# Patient Record
Sex: FEMALE | Race: WHITE | NOT HISPANIC OR LATINO | Employment: FULL TIME | URBAN - METROPOLITAN AREA
[De-identification: names, ages, dates, MRNs, and addresses within clinical notes are randomized per-mention and may not be internally consistent; named-entity substitution may affect disease eponyms.]

---

## 2017-01-11 ENCOUNTER — TRANSCRIBE ORDERS (OUTPATIENT)
Dept: ADMINISTRATIVE | Facility: HOSPITAL | Age: 52
End: 2017-01-11

## 2017-01-11 DIAGNOSIS — Z13.9 SCREENING: Primary | ICD-10-CM

## 2017-01-18 ENCOUNTER — APPOINTMENT (OUTPATIENT)
Dept: MAMMOGRAPHY | Facility: HOSPITAL | Age: 52
End: 2017-01-18
Attending: INTERNAL MEDICINE

## 2017-01-18 ENCOUNTER — HOSPITAL ENCOUNTER (OUTPATIENT)
Dept: MAMMOGRAPHY | Facility: HOSPITAL | Age: 52
Discharge: HOME | End: 2017-01-18
Attending: INTERNAL MEDICINE | Admitting: INTERNAL MEDICINE

## 2017-01-18 DIAGNOSIS — Z13.9 SCREENING: ICD-10-CM

## 2017-01-18 PROCEDURE — G0202 SCR MAMMO BI INCL CAD: HCPCS

## 2017-01-18 PROCEDURE — 77063 BREAST TOMOSYNTHESIS BI: CPT

## 2017-01-20 ENCOUNTER — HOSPITAL ENCOUNTER (OUTPATIENT)
Dept: CT IMAGING | Facility: HOSPITAL | Age: 52
Discharge: HOME | End: 2017-01-20
Attending: INTERNAL MEDICINE | Admitting: INTERNAL MEDICINE

## 2017-01-20 ENCOUNTER — LAB (OUTPATIENT)
Dept: LAB | Facility: HOSPITAL | Age: 52
End: 2017-01-20
Attending: INTERNAL MEDICINE

## 2017-01-20 ENCOUNTER — TRANSCRIBE ORDERS (OUTPATIENT)
Dept: ADMINISTRATIVE | Facility: HOSPITAL | Age: 52
End: 2017-01-20

## 2017-01-20 DIAGNOSIS — Z87.442 HISTORY OF KIDNEY STONES: ICD-10-CM

## 2017-01-20 DIAGNOSIS — M54.5 LOW BACK PAIN, UNSPECIFIED BACK PAIN LATERALITY, UNSPECIFIED CHRONICITY, WITH SCIATICA PRESENCE UNSPECIFIED: Primary | ICD-10-CM

## 2017-01-20 DIAGNOSIS — N20.0 KIDNEY STONE: Primary | ICD-10-CM

## 2017-01-20 DIAGNOSIS — N20.0 KIDNEY STONE: ICD-10-CM

## 2017-01-20 DIAGNOSIS — M54.5 LOW BACK PAIN, UNSPECIFIED BACK PAIN LATERALITY, UNSPECIFIED CHRONICITY, WITH SCIATICA PRESENCE UNSPECIFIED: ICD-10-CM

## 2017-01-20 LAB
ANION GAP SERPL CALCULATED.3IONS-SCNC: 11.4 MMOL/L
BASOPHILS # BLD AUTO: 0.06 10*3/MM3 (ref 0–0.2)
BASOPHILS NFR BLD AUTO: 1 % (ref 0–2)
BILIRUB UR QL STRIP: NEGATIVE
BUN BLD-MCNC: 11 MG/DL (ref 6–20)
BUN/CREAT SERPL: 12.4 (ref 7–25)
CALCIUM SPEC-SCNC: 9 MG/DL (ref 8.6–10.5)
CHLORIDE SERPL-SCNC: 97 MMOL/L (ref 98–107)
CLARITY UR: CLEAR
CO2 SERPL-SCNC: 27.6 MMOL/L (ref 22–29)
COLOR UR: YELLOW
CREAT BLD-MCNC: 0.89 MG/DL (ref 0.57–1)
DEPRECATED RDW RBC AUTO: 40.3 FL (ref 37–54)
EOSINOPHIL # BLD AUTO: 0.21 10*3/MM3 (ref 0.1–0.3)
EOSINOPHIL NFR BLD AUTO: 3.6 % (ref 0–4)
ERYTHROCYTE [DISTWIDTH] IN BLOOD BY AUTOMATED COUNT: 12.9 % (ref 11.5–14.5)
GFR SERPL CREATININE-BSD FRML MDRD: 67 ML/MIN/1.73
GLUCOSE BLD-MCNC: 85 MG/DL (ref 65–99)
GLUCOSE UR STRIP-MCNC: NEGATIVE MG/DL
HCT VFR BLD AUTO: 39.6 % (ref 37–47)
HGB BLD-MCNC: 12.9 G/DL (ref 12–16)
HGB UR QL STRIP.AUTO: NEGATIVE
IMM GRANULOCYTES # BLD: 0.01 10*3/MM3 (ref 0–0.03)
IMM GRANULOCYTES NFR BLD: 0.2 % (ref 0–0.5)
KETONES UR QL STRIP: NEGATIVE
LEUKOCYTE ESTERASE UR QL STRIP.AUTO: NEGATIVE
LYMPHOCYTES # BLD AUTO: 2.24 10*3/MM3 (ref 0.6–4.8)
LYMPHOCYTES NFR BLD AUTO: 38 % (ref 20–45)
MCH RBC QN AUTO: 28.2 PG (ref 27–31)
MCHC RBC AUTO-ENTMCNC: 32.6 G/DL (ref 31–37)
MCV RBC AUTO: 86.5 FL (ref 81–99)
MONOCYTES # BLD AUTO: 0.42 10*3/MM3 (ref 0–1)
MONOCYTES NFR BLD AUTO: 7.1 % (ref 3–8)
NEUTROPHILS # BLD AUTO: 2.95 10*3/MM3 (ref 1.5–8.3)
NEUTROPHILS NFR BLD AUTO: 50.1 % (ref 45–70)
NITRITE UR QL STRIP: NEGATIVE
NRBC BLD MANUAL-RTO: 0 /100 WBC (ref 0–0)
PH UR STRIP.AUTO: 6 [PH] (ref 4.5–8)
PLATELET # BLD AUTO: 309 10*3/MM3 (ref 140–500)
PMV BLD AUTO: 8.4 FL (ref 7.4–10.4)
POTASSIUM BLD-SCNC: 3.9 MMOL/L (ref 3.5–5.2)
PROT UR QL STRIP: NEGATIVE
RBC # BLD AUTO: 4.58 10*6/MM3 (ref 4.2–5.4)
SODIUM BLD-SCNC: 136 MMOL/L (ref 136–145)
SP GR UR STRIP: 1.01 (ref 1–1.03)
UROBILINOGEN UR QL STRIP: NORMAL
WBC NRBC COR # BLD: 5.89 10*3/MM3 (ref 4.8–10.8)

## 2017-01-20 PROCEDURE — 80048 BASIC METABOLIC PNL TOTAL CA: CPT

## 2017-01-20 PROCEDURE — 74176 CT ABD & PELVIS W/O CONTRAST: CPT

## 2017-01-20 PROCEDURE — 85025 COMPLETE CBC W/AUTO DIFF WBC: CPT

## 2017-01-20 PROCEDURE — 36415 COLL VENOUS BLD VENIPUNCTURE: CPT

## 2017-01-20 PROCEDURE — 81003 URINALYSIS AUTO W/O SCOPE: CPT

## 2018-04-06 ENCOUNTER — TRANSCRIBE ORDERS (OUTPATIENT)
Dept: ADMINISTRATIVE | Facility: HOSPITAL | Age: 53
End: 2018-04-06

## 2018-04-06 DIAGNOSIS — Z12.39 ENCOUNTER FOR SCREENING BREAST EXAMINATION: Primary | ICD-10-CM

## 2018-04-19 ENCOUNTER — HOSPITAL ENCOUNTER (OUTPATIENT)
Dept: MAMMOGRAPHY | Facility: HOSPITAL | Age: 53
Discharge: HOME | End: 2018-04-19
Attending: INTERNAL MEDICINE | Admitting: INTERNAL MEDICINE

## 2018-04-19 DIAGNOSIS — Z12.39 ENCOUNTER FOR SCREENING BREAST EXAMINATION: ICD-10-CM

## 2018-04-19 PROCEDURE — 77063 BREAST TOMOSYNTHESIS BI: CPT

## 2018-04-19 PROCEDURE — 77067 SCR MAMMO BI INCL CAD: CPT

## 2019-01-28 ENCOUNTER — TRANSCRIBE ORDERS (OUTPATIENT)
Dept: ADMINISTRATIVE | Facility: HOSPITAL | Age: 54
End: 2019-01-28

## 2019-01-28 ENCOUNTER — HOSPITAL ENCOUNTER (OUTPATIENT)
Dept: MRI IMAGING | Facility: HOSPITAL | Age: 54
Discharge: HOME | End: 2019-01-28
Attending: INTERNAL MEDICINE

## 2019-01-28 DIAGNOSIS — M54.5 LOW BACK PAIN, UNSPECIFIED BACK PAIN LATERALITY, UNSPECIFIED CHRONICITY, WITH SCIATICA PRESENCE UNSPECIFIED: ICD-10-CM

## 2019-01-28 DIAGNOSIS — M54.5 LOW BACK PAIN, UNSPECIFIED BACK PAIN LATERALITY, UNSPECIFIED CHRONICITY, WITH SCIATICA PRESENCE UNSPECIFIED: Primary | ICD-10-CM

## 2019-01-29 ENCOUNTER — HOSPITAL ENCOUNTER (OUTPATIENT)
Dept: MRI IMAGING | Facility: HOSPITAL | Age: 54
Discharge: HOME | End: 2019-01-29
Attending: INTERNAL MEDICINE | Admitting: INTERNAL MEDICINE

## 2019-01-29 PROCEDURE — 72148 MRI LUMBAR SPINE W/O DYE: CPT

## 2019-05-09 ENCOUNTER — OFFICE VISIT (OUTPATIENT)
Dept: FAMILY MEDICINE CLINIC | Facility: CLINIC | Age: 54
End: 2019-05-09

## 2019-05-09 VITALS
SYSTOLIC BLOOD PRESSURE: 112 MMHG | HEART RATE: 94 BPM | HEIGHT: 68 IN | BODY MASS INDEX: 40.83 KG/M2 | OXYGEN SATURATION: 95 % | DIASTOLIC BLOOD PRESSURE: 84 MMHG | WEIGHT: 269.4 LBS | TEMPERATURE: 98.7 F | RESPIRATION RATE: 18 BRPM

## 2019-05-09 DIAGNOSIS — K21.9 GASTROESOPHAGEAL REFLUX DISEASE WITHOUT ESOPHAGITIS: ICD-10-CM

## 2019-05-09 DIAGNOSIS — E78.5 HYPERLIPIDEMIA, UNSPECIFIED HYPERLIPIDEMIA TYPE: Primary | ICD-10-CM

## 2019-05-09 DIAGNOSIS — G44.209 TENSION-TYPE HEADACHE, NOT INTRACTABLE, UNSPECIFIED CHRONICITY PATTERN: ICD-10-CM

## 2019-05-09 PROBLEM — M71.38 SYNOVIAL CYST OF LUMBAR FACET JOINT: Status: ACTIVE | Noted: 2019-02-01

## 2019-05-09 PROBLEM — E66.01 MORBID OBESITY WITH BMI OF 40.0-44.9, ADULT (HCC): Status: ACTIVE | Noted: 2019-02-01

## 2019-05-09 PROBLEM — F41.9 ANXIETY: Status: ACTIVE | Noted: 2019-05-09

## 2019-05-09 PROBLEM — F33.41 RECURRENT MAJOR DEPRESSIVE DISORDER, IN PARTIAL REMISSION (HCC): Status: ACTIVE | Noted: 2019-05-09

## 2019-05-09 PROBLEM — M54.16 LUMBAR RADICULOPATHY: Status: ACTIVE | Noted: 2019-02-01

## 2019-05-09 LAB
ALBUMIN SERPL-MCNC: 3.8 G/DL (ref 3.5–5.2)
ALBUMIN/GLOB SERPL: 1.7 G/DL
ALP SERPL-CCNC: 134 U/L (ref 39–117)
ALT SERPL-CCNC: 16 U/L (ref 1–33)
AST SERPL-CCNC: 16 U/L (ref 1–32)
BILIRUB SERPL-MCNC: 0.4 MG/DL (ref 0.2–1.2)
BUN SERPL-MCNC: 8 MG/DL (ref 6–20)
BUN/CREAT SERPL: 8.9 (ref 7–25)
CALCIUM SERPL-MCNC: 9.3 MG/DL (ref 8.6–10.5)
CHLORIDE SERPL-SCNC: 104 MMOL/L (ref 98–107)
CHOLEST SERPL-MCNC: 151 MG/DL (ref 0–200)
CHOLEST/HDLC SERPL: 2.4 {RATIO}
CO2 SERPL-SCNC: 24.9 MMOL/L (ref 22–29)
CREAT SERPL-MCNC: 0.9 MG/DL (ref 0.57–1)
GLOBULIN SER CALC-MCNC: 2.3 GM/DL
GLUCOSE SERPL-MCNC: 94 MG/DL (ref 65–99)
HDLC SERPL-MCNC: 63 MG/DL (ref 40–60)
LDLC SERPL CALC-MCNC: 68 MG/DL (ref 0–100)
POTASSIUM SERPL-SCNC: 4.2 MMOL/L (ref 3.5–5.2)
PROT SERPL-MCNC: 6.1 G/DL (ref 6–8.5)
SODIUM SERPL-SCNC: 140 MMOL/L (ref 136–145)
TRIGL SERPL-MCNC: 102 MG/DL (ref 0–150)
VLDLC SERPL CALC-MCNC: 20.4 MG/DL

## 2019-05-09 PROCEDURE — 99213 OFFICE O/P EST LOW 20 MIN: CPT | Performed by: INTERNAL MEDICINE

## 2019-05-09 RX ORDER — BUPROPION HYDROCHLORIDE 150 MG/1
TABLET ORAL
COMMUNITY
Start: 2019-04-26

## 2019-05-09 RX ORDER — OMEPRAZOLE 20 MG/1
20 CAPSULE, DELAYED RELEASE ORAL DAILY
COMMUNITY
End: 2020-08-23

## 2019-05-09 RX ORDER — ARIPIPRAZOLE 10 MG/1
TABLET ORAL
COMMUNITY
Start: 2019-04-26

## 2019-05-09 RX ORDER — BUPROPION HYDROCHLORIDE 300 MG/1
TABLET ORAL
COMMUNITY
Start: 2019-04-26

## 2019-05-09 RX ORDER — BACLOFEN 10 MG/1
TABLET ORAL
COMMUNITY
Start: 2019-04-14 | End: 2019-10-08 | Stop reason: SDUPTHER

## 2019-05-09 RX ORDER — CETIRIZINE HYDROCHLORIDE 10 MG/1
10 TABLET ORAL DAILY
COMMUNITY

## 2019-05-09 RX ORDER — ALPRAZOLAM 0.5 MG/1
TABLET ORAL
COMMUNITY
Start: 2012-10-30 | End: 2019-05-09 | Stop reason: ALTCHOICE

## 2019-05-09 RX ORDER — VENLAFAXINE HYDROCHLORIDE 150 MG/1
CAPSULE, EXTENDED RELEASE ORAL
COMMUNITY
Start: 2019-04-26

## 2019-05-09 RX ORDER — LAMOTRIGINE 100 MG/1
TABLET ORAL
COMMUNITY
Start: 2019-04-24

## 2019-05-09 RX ORDER — AMITRIPTYLINE HYDROCHLORIDE 50 MG/1
TABLET, FILM COATED ORAL
COMMUNITY
Start: 2019-04-26

## 2019-05-09 RX ORDER — CLONAZEPAM 0.5 MG/1
TABLET ORAL
COMMUNITY
Start: 2019-02-09

## 2019-05-09 NOTE — PROGRESS NOTES
Subjective   Cayla Cole is a 53 y.o. female.     Chief Complaint   Patient presents with   • Headache     PT HERE FOR BACLOFEN    Patient here for follow-up on hyperlipidemia, GERD    History of Present Illness   Patient here for follow-up for hyperlipidemia, GERD, tension headaches.  Uses baclofen on and off.  Reports she stopped taking Lipitor 3 months ago as she was having restless leg syndrome it helped her some.  Patient seen by psychiatry for depression.  Taking medication.  No SI or HI.  Continues to diet and exercise.  The following portions of the patient's history were reviewed and updated as appropriate: allergies, current medications, past family history, past medical history, past social history, past surgical history and problem list.    Review of Systems   Constitutional: Negative for activity change, appetite change, fatigue and fever.   Eyes: Negative for blurred vision and double vision.   Respiratory: Negative for shortness of breath.    Cardiovascular: Negative for chest pain, palpitations and leg swelling.   Gastrointestinal: Negative for diarrhea, nausea, vomiting, GERD and indigestion.   Musculoskeletal: Negative for arthralgias, back pain and joint swelling.   Neurological: Negative for dizziness, syncope, light-headedness and headache.   Psychiatric/Behavioral: Negative for dysphoric mood and depressed mood.       Allergies   Allergen Reactions   • Nsaids Anaphylaxis       Current Outpatient Medications on File Prior to Visit   Medication Sig Dispense Refill   • amitriptyline (ELAVIL) 50 MG tablet      • ARIPiprazole (ABILIFY) 10 MG tablet      • baclofen (LIORESAL) 10 MG tablet      • buPROPion XL (WELLBUTRIN XL) 150 MG 24 hr tablet      • buPROPion XL (WELLBUTRIN XL) 300 MG 24 hr tablet      • cetirizine (zyrTEC) 10 MG tablet Take 10 mg by mouth Daily.     • clonazePAM (KlonoPIN) 0.5 MG tablet      • lamoTRIgine (LaMICtal) 100 MG tablet      • omeprazole (priLOSEC) 20 MG capsule Take  20 mg by mouth Daily.     • venlafaxine XR (EFFEXOR-XR) 150 MG 24 hr capsule      • [DISCONTINUED] ALPRAZolam (XANAX) 0.5 MG tablet Take  by mouth.       No current facility-administered medications on file prior to visit.        Family History   Problem Relation Age of Onset   • Rheum arthritis Mother    • Kidney disease Mother    • Hypertension Father    • Dementia Father    • Heart murmur Sister    • Post-traumatic stress disorder Brother    • No Known Problems Son    • Cervical cancer Maternal Grandmother    • Aneurysm Maternal Grandmother    • No Known Problems Son        Past Medical History:   Diagnosis Date   • Anxiety    • Arthritis    • Depression    • Diverticulosis    • GERD (gastroesophageal reflux disease)    • Headache    • Kidney stone    • Low back pain    • Scoliosis        Past Surgical History:   Procedure Laterality Date   •  SECTION     • KNEE MENISCAL REPAIR Left    • LASER ABLATION OF THE CERVIX     • WISDOM TOOTH EXTRACTION         Social History     Socioeconomic History   • Marital status:      Spouse name: Not on file   • Number of children: Not on file   • Years of education: Not on file   • Highest education level: Not on file   Tobacco Use   • Smoking status: Never Smoker   • Smokeless tobacco: Never Used   Substance and Sexual Activity   • Alcohol use: Yes     Comment: OCCASION   • Drug use: No   • Sexual activity: Not Currently       Patient Active Problem List   Diagnosis   • Anxiety   • Lumbar radiculopathy   • Morbid obesity with BMI of 40.0-44.9, adult (CMS/[facility])   • Synovial cyst of lumbar facet joint   • Hyperlipidemia   • Gastroesophageal reflux disease without esophagitis   • Tension-type headache, not intractable   • Recurrent major depressive disorder, in partial remission (CMS/[facility])       Vitals:    19 1026   BP: 112/84   Pulse: 94   Resp: 18   Temp: 98.7 °F (37.1 °C)   SpO2: 95%       Objective   Physical Exam   Constitutional: She is oriented to  person, place, and time. She appears well-developed.   HENT:   Head: Normocephalic and atraumatic.   Eyes: Pupils are equal, round, and reactive to light.   Neck: Normal range of motion. Neck supple. No JVD present. No tracheal deviation present. No thyromegaly present.   Cardiovascular: Normal rate, regular rhythm and intact distal pulses.   No murmur heard.  Pulmonary/Chest: Effort normal and breath sounds normal. No respiratory distress. She has no wheezes.   Abdominal: Soft. Bowel sounds are normal.   Musculoskeletal: She exhibits no edema.   Lymphadenopathy:     She has no cervical adenopathy.   Neurological: She is alert and oriented to person, place, and time.   Psychiatric: She has a normal mood and affect.         Assessment/Plan   Cayla was seen today for headache.    Diagnoses and all orders for this visit:    Hyperlipidemia, unspecified hyperlipidemia type  -     Comprehensive Metabolic Panel  -     Lipid Panel With / Chol / HDL Ratio    Gastroesophageal reflux disease without esophagitis  -     Comprehensive Metabolic Panel  -     Lipid Panel With / Chol / HDL Ratio    Tension-type headache, not intractable, unspecified chronicity pattern    Discussed with patient continue medication.  She is willing to try another statin if cholesterol is high.  Lose weight.  Continue diet and exercise.  Check blood pressure.  Keep follow-up with the psychiatrist.  Last EGD done was 2016 showed esophageal stricture by Dr. Pabon.  Encourage patient to go back and see Dr. Pabon for another EGD.  Or at least call him.  Return in 3 months fasting

## 2019-05-23 ENCOUNTER — CLINICAL SUPPORT (OUTPATIENT)
Dept: FAMILY MEDICINE CLINIC | Facility: CLINIC | Age: 54
End: 2019-05-23

## 2019-05-23 DIAGNOSIS — S61.219A LACERATION OF FINGER, FOREIGN BODY PRESENCE UNSPECIFIED, NAIL DAMAGE STATUS UNSPECIFIED, UNSPECIFIED FINGER, UNSPECIFIED LATERALITY, INITIAL ENCOUNTER: Primary | ICD-10-CM

## 2019-05-23 PROCEDURE — 90715 TDAP VACCINE 7 YRS/> IM: CPT | Performed by: INTERNAL MEDICINE

## 2019-05-23 PROCEDURE — 90471 IMMUNIZATION ADMIN: CPT | Performed by: INTERNAL MEDICINE

## 2019-08-08 ENCOUNTER — OFFICE (OUTPATIENT)
Dept: URBAN - METROPOLITAN AREA CLINIC 42 | Facility: CLINIC | Age: 54
End: 2019-08-08

## 2019-08-08 VITALS
HEIGHT: 67 IN | TEMPERATURE: 98.1 F | HEART RATE: 106 BPM | SYSTOLIC BLOOD PRESSURE: 132 MMHG | WEIGHT: 278 LBS | DIASTOLIC BLOOD PRESSURE: 81 MMHG

## 2019-08-08 DIAGNOSIS — K22.2 ESOPHAGEAL OBSTRUCTION: ICD-10-CM

## 2019-08-08 DIAGNOSIS — K21.9 GASTRO-ESOPHAGEAL REFLUX DISEASE WITHOUT ESOPHAGITIS: ICD-10-CM

## 2019-08-08 DIAGNOSIS — R13.10 DYSPHAGIA, UNSPECIFIED: ICD-10-CM

## 2019-08-08 PROCEDURE — 99204 OFFICE O/P NEW MOD 45 MIN: CPT

## 2019-08-09 RX ORDER — OMEPRAZOLE 40 MG/1
40 CAPSULE, DELAYED RELEASE ORAL
Qty: 90 | Refills: 3 | Status: ACTIVE

## 2019-08-15 ENCOUNTER — OFFICE VISIT (OUTPATIENT)
Dept: FAMILY MEDICINE CLINIC | Facility: CLINIC | Age: 54
End: 2019-08-15

## 2019-08-15 VITALS
WEIGHT: 276 LBS | BODY MASS INDEX: 41.83 KG/M2 | OXYGEN SATURATION: 98 % | HEART RATE: 92 BPM | HEIGHT: 68 IN | SYSTOLIC BLOOD PRESSURE: 114 MMHG | RESPIRATION RATE: 14 BRPM | TEMPERATURE: 98.5 F | DIASTOLIC BLOOD PRESSURE: 74 MMHG

## 2019-08-15 DIAGNOSIS — K21.9 GASTROESOPHAGEAL REFLUX DISEASE WITHOUT ESOPHAGITIS: ICD-10-CM

## 2019-08-15 DIAGNOSIS — G44.229 CHRONIC TENSION-TYPE HEADACHE, NOT INTRACTABLE: ICD-10-CM

## 2019-08-15 DIAGNOSIS — E78.5 HYPERLIPIDEMIA, UNSPECIFIED HYPERLIPIDEMIA TYPE: Primary | ICD-10-CM

## 2019-08-15 PROCEDURE — 99213 OFFICE O/P EST LOW 20 MIN: CPT | Performed by: INTERNAL MEDICINE

## 2019-08-15 NOTE — PROGRESS NOTES
MACARENA@  Cayla Cole is a 53 y.o. female.     Chief Complaint   Patient presents with   • Hyperlipidemia   • Heartburn   • Anxiety       History of Present Illness   Patient here follow-up for hyperlipidemia, GERD, chronic headaches.  She is seeing psychiatrist for depression.  She is on several psych medications.  Follows regularly.  Reports trying to diet and exercise.  No chest pain or shortness of breath.  Complains of occasional pedal edema especially after being on the feet all through the day.  Improve in the morning.  No shortness of breath or chest pain.  On exam there was no swelling.  The following portions of the patient's history were reviewed and updated as appropriate: allergies, current medications, past family history, past medical history, past social history, past surgical history and problem list.    Review of Systems   Constitutional: Negative for activity change, appetite change, fatigue and fever.   Eyes: Negative for blurred vision and double vision.   Respiratory: Negative for shortness of breath.    Cardiovascular: Negative for chest pain, palpitations and leg swelling.   Gastrointestinal: Negative.    Genitourinary: Negative for difficulty urinating and frequency.   Neurological: Negative for dizziness, syncope, light-headedness and headache.   Psychiatric/Behavioral: Positive for stress. Negative for suicidal ideas.        Has depression but under control.  No SI or HI.       Allergies   Allergen Reactions   • Nsaids Anaphylaxis       Current Outpatient Medications on File Prior to Visit   Medication Sig Dispense Refill   • amitriptyline (ELAVIL) 50 MG tablet      • ARIPiprazole (ABILIFY) 10 MG tablet      • baclofen (LIORESAL) 10 MG tablet      • buPROPion XL (WELLBUTRIN XL) 150 MG 24 hr tablet      • buPROPion XL (WELLBUTRIN XL) 300 MG 24 hr tablet      • cetirizine (zyrTEC) 10 MG tablet Take 10 mg by mouth Daily.     • clonazePAM (KlonoPIN) 0.5 MG tablet      • lamoTRIgine  (LaMICtal) 100 MG tablet      • omeprazole (priLOSEC) 20 MG capsule Take 20 mg by mouth Daily.     • venlafaxine XR (EFFEXOR-XR) 150 MG 24 hr capsule        No current facility-administered medications on file prior to visit.        Family History   Problem Relation Age of Onset   • Rheum arthritis Mother    • Kidney disease Mother    • Hypertension Father    • Dementia Father    • Heart murmur Sister    • Post-traumatic stress disorder Brother    • No Known Problems Son    • Cervical cancer Maternal Grandmother    • Aneurysm Maternal Grandmother    • No Known Problems Son        Past Medical History:   Diagnosis Date   • Anxiety    • Arthritis    • Depression    • Diverticulosis    • GERD (gastroesophageal reflux disease)    • Headache    • Kidney stone    • Low back pain    • Scoliosis        Past Surgical History:   Procedure Laterality Date   •  SECTION     • KNEE MENISCAL REPAIR Left    • LASER ABLATION OF THE CERVIX     • WISDOM TOOTH EXTRACTION         Social History     Socioeconomic History   • Marital status:      Spouse name: Not on file   • Number of children: Not on file   • Years of education: Not on file   • Highest education level: Not on file   Tobacco Use   • Smoking status: Never Smoker   • Smokeless tobacco: Never Used   Substance and Sexual Activity   • Alcohol use: Yes     Comment: OCCASION   • Drug use: No   • Sexual activity: Not Currently       Patient Active Problem List   Diagnosis   • Anxiety   • Lumbar radiculopathy   • Morbid obesity with BMI of 40.0-44.9, adult (CMS/[facility])   • Synovial cyst of lumbar facet joint   • Hyperlipidemia   • Gastroesophageal reflux disease without esophagitis   • Tension-type headache, not intractable   • Recurrent major depressive disorder, in partial remission (CMS/[facility])       Vitals:    08/15/19 0823   BP: 114/74   Pulse: 92   Resp: 14   Temp: 98.5 °F (36.9 °C)   SpO2: 98%       Objective   Physical Exam   Constitutional: She is oriented to  person, place, and time. She appears well-developed and well-nourished.   HENT:   Head: Normocephalic and atraumatic.   Neck: Normal range of motion. Neck supple.   Cardiovascular: Normal rate, regular rhythm and normal heart sounds.   No murmur heard.  Pulmonary/Chest: Effort normal and breath sounds normal. No respiratory distress. She has no wheezes.   Abdominal: Soft. Bowel sounds are normal. She exhibits no mass. There is no tenderness. There is no guarding.   Musculoskeletal: She exhibits no edema.   Neurological: She is alert and oriented to person, place, and time.   Nursing note and vitals reviewed.        Assessment/Plan   Cayla was seen today for hyperlipidemia, heartburn and anxiety.    Diagnoses and all orders for this visit:    Hyperlipidemia, unspecified hyperlipidemia type  -     CK  -     Comprehensive metabolic panel  -     Lipid panel  -     Hepatitis C antibody  -     TSH    Gastroesophageal reflux disease without esophagitis  -     CK  -     Comprehensive metabolic panel  -     Lipid panel  -     Hepatitis C antibody  -     TSH    Chronic tension-type headache, not intractable  -     CK  -     Comprehensive metabolic panel  -     Lipid panel  -     Hepatitis C antibody  -     TSH    Discussed with patient continue diet and exercise lose weight.  Continue to monitor labs.  Continue current medications.  If swelling on the legs continues to increase return.  Patient has no other symptoms.  She can try it stockings if needed.  Return in 3 months

## 2019-08-16 LAB
ALBUMIN SERPL-MCNC: 3.9 G/DL (ref 3.5–5.2)
ALBUMIN/GLOB SERPL: 1.6 G/DL
ALP SERPL-CCNC: 129 U/L (ref 39–117)
ALT SERPL-CCNC: 17 U/L (ref 1–33)
AST SERPL-CCNC: 19 U/L (ref 1–32)
BILIRUB SERPL-MCNC: 0.2 MG/DL (ref 0.2–1.2)
BUN SERPL-MCNC: 8 MG/DL (ref 6–20)
BUN/CREAT SERPL: 9.3 (ref 7–25)
CALCIUM SERPL-MCNC: 8.6 MG/DL (ref 8.6–10.5)
CHLORIDE SERPL-SCNC: 103 MMOL/L (ref 98–107)
CHOLEST SERPL-MCNC: 213 MG/DL (ref 0–200)
CK SERPL-CCNC: 97 U/L (ref 20–180)
CO2 SERPL-SCNC: 26.1 MMOL/L (ref 22–29)
CREAT SERPL-MCNC: 0.86 MG/DL (ref 0.57–1)
GLOBULIN SER CALC-MCNC: 2.5 GM/DL
GLUCOSE SERPL-MCNC: 95 MG/DL (ref 65–99)
HCV AB S/CO SERPL IA: <0.1 S/CO RATIO (ref 0–0.9)
HDLC SERPL-MCNC: 64 MG/DL (ref 40–60)
LDLC SERPL CALC-MCNC: 123 MG/DL (ref 0–100)
POTASSIUM SERPL-SCNC: 4.4 MMOL/L (ref 3.5–5.2)
PROT SERPL-MCNC: 6.4 G/DL (ref 6–8.5)
SODIUM SERPL-SCNC: 139 MMOL/L (ref 136–145)
TRIGL SERPL-MCNC: 131 MG/DL (ref 0–150)
TSH SERPL DL<=0.005 MIU/L-ACNC: 2.02 MIU/ML (ref 0.27–4.2)
VLDLC SERPL CALC-MCNC: 26.2 MG/DL

## 2019-08-28 ENCOUNTER — ON CAMPUS - OUTPATIENT (OUTPATIENT)
Dept: URBAN - METROPOLITAN AREA HOSPITAL 91 | Facility: HOSPITAL | Age: 54
End: 2019-08-28
Payer: COMMERCIAL

## 2019-08-28 DIAGNOSIS — R10.13 EPIGASTRIC PAIN: ICD-10-CM

## 2019-08-28 DIAGNOSIS — K29.50 UNSPECIFIED CHRONIC GASTRITIS WITHOUT BLEEDING: ICD-10-CM

## 2019-08-28 DIAGNOSIS — R13.10 DYSPHAGIA, UNSPECIFIED: ICD-10-CM

## 2019-08-28 DIAGNOSIS — K21.9 GASTRO-ESOPHAGEAL REFLUX DISEASE WITHOUT ESOPHAGITIS: ICD-10-CM

## 2019-08-28 DIAGNOSIS — K22.2 ESOPHAGEAL OBSTRUCTION: ICD-10-CM

## 2019-08-28 DIAGNOSIS — K44.9 DIAPHRAGMATIC HERNIA WITHOUT OBSTRUCTION OR GANGRENE: ICD-10-CM

## 2019-08-28 PROCEDURE — 43239 EGD BIOPSY SINGLE/MULTIPLE: CPT

## 2019-08-28 PROCEDURE — 43249 ESOPH EGD DILATION <30 MM: CPT

## 2019-10-08 ENCOUNTER — OFFICE VISIT (OUTPATIENT)
Dept: FAMILY MEDICINE CLINIC | Facility: CLINIC | Age: 54
End: 2019-10-08

## 2019-10-08 VITALS
RESPIRATION RATE: 14 BRPM | OXYGEN SATURATION: 98 % | HEIGHT: 68 IN | TEMPERATURE: 98.2 F | HEART RATE: 82 BPM | BODY MASS INDEX: 43.35 KG/M2 | DIASTOLIC BLOOD PRESSURE: 72 MMHG | SYSTOLIC BLOOD PRESSURE: 116 MMHG | WEIGHT: 286 LBS

## 2019-10-08 DIAGNOSIS — M54.50 ACUTE LEFT-SIDED LOW BACK PAIN WITHOUT SCIATICA: Primary | ICD-10-CM

## 2019-10-08 PROCEDURE — 99213 OFFICE O/P EST LOW 20 MIN: CPT | Performed by: INTERNAL MEDICINE

## 2019-10-08 RX ORDER — METHYLPREDNISOLONE 4 MG/1
TABLET ORAL
Qty: 1 EACH | Refills: 0 | Status: SHIPPED | OUTPATIENT
Start: 2019-10-08 | End: 2019-11-21

## 2019-10-08 RX ORDER — BACLOFEN 10 MG/1
10 TABLET ORAL 2 TIMES DAILY PRN
Qty: 30 TABLET | Refills: 1 | Status: SHIPPED | OUTPATIENT
Start: 2019-10-08 | End: 2019-11-04 | Stop reason: SDUPTHER

## 2019-10-08 NOTE — PROGRESS NOTES
MACARENA@  Cayla Cole is a 53 y.o. female.     Chief Complaint   Patient presents with   • Back Pain     left side pain down leg       History of Present Illness   Patient here for low back pain with left leg radiation no numbness.  No injury.  Patient reports she has been standing at a concert.  No fever chills or abdominal pain.  Patient has history of back pain from before.  The following portions of the patient's history were reviewed and updated as appropriate: allergies, current medications, past family history, past medical history, past social history, past surgical history and problem list.    Review of Systems   Constitutional: Negative.    HENT: Negative.    Eyes: Negative.    Respiratory: Negative.    Cardiovascular: Negative.    Gastrointestinal: Negative.    Endocrine: Negative.    Genitourinary: Negative.    Musculoskeletal: Positive for back pain.   Skin: Negative.    Allergic/Immunologic: Negative.    Neurological: Negative.    Hematological: Negative.    Psychiatric/Behavioral: Negative.        Allergies   Allergen Reactions   • Nsaids Anaphylaxis       Current Outpatient Medications on File Prior to Visit   Medication Sig Dispense Refill   • amitriptyline (ELAVIL) 50 MG tablet      • ARIPiprazole (ABILIFY) 10 MG tablet      • buPROPion XL (WELLBUTRIN XL) 150 MG 24 hr tablet      • buPROPion XL (WELLBUTRIN XL) 300 MG 24 hr tablet      • cetirizine (zyrTEC) 10 MG tablet Take 10 mg by mouth Daily.     • clonazePAM (KlonoPIN) 0.5 MG tablet      • lamoTRIgine (LaMICtal) 100 MG tablet      • omeprazole (priLOSEC) 20 MG capsule Take 20 mg by mouth Daily.     • venlafaxine XR (EFFEXOR-XR) 150 MG 24 hr capsule      • [DISCONTINUED] baclofen (LIORESAL) 10 MG tablet        No current facility-administered medications on file prior to visit.        Family History   Problem Relation Age of Onset   • Rheum arthritis Mother    • Kidney disease Mother    • Hypertension Father    • Dementia Father    •  Heart murmur Sister    • Post-traumatic stress disorder Brother    • No Known Problems Son    • Cervical cancer Maternal Grandmother    • Aneurysm Maternal Grandmother    • No Known Problems Son        Past Medical History:   Diagnosis Date   • Anxiety    • Arthritis    • Depression    • Diverticulosis    • GERD (gastroesophageal reflux disease)    • Headache    • Kidney stone    • Low back pain    • Scoliosis        Past Surgical History:   Procedure Laterality Date   •  SECTION     • KNEE MENISCAL REPAIR Left    • LASER ABLATION OF THE CERVIX     • WISDOM TOOTH EXTRACTION         Social History     Socioeconomic History   • Marital status:      Spouse name: Not on file   • Number of children: Not on file   • Years of education: Not on file   • Highest education level: Not on file   Tobacco Use   • Smoking status: Never Smoker   • Smokeless tobacco: Never Used   Substance and Sexual Activity   • Alcohol use: Yes     Comment: OCCASION   • Drug use: No   • Sexual activity: Not Currently       Patient Active Problem List   Diagnosis   • Anxiety   • Lumbar radiculopathy   • Morbid obesity with BMI of 40.0-44.9, adult (CMS/[facility])   • Synovial cyst of lumbar facet joint   • Hyperlipidemia   • Gastroesophageal reflux disease without esophagitis   • Tension-type headache, not intractable   • Recurrent major depressive disorder, in partial remission (CMS/[facility])       Vitals:    10/08/19 1020   BP: 116/72   Pulse: 82   Resp: 14   Temp: 98.2 °F (36.8 °C)   SpO2: 98%       Objective   Physical Exam   Constitutional: She is oriented to person, place, and time.   Neck: Normal range of motion. Neck supple. No JVD present. No tracheal deviation present. No thyromegaly present.   Cardiovascular: Normal rate and regular rhythm.   Pulmonary/Chest: Effort normal and breath sounds normal. She has no wheezes.   Abdominal: Soft. Bowel sounds are normal. She exhibits no distension. There is no tenderness. There is no guarding.    Musculoskeletal:   Back positive tender on the left L1-L5 paraspinal muscles.  SLR negative.  DTR 2+, motor 5/5   Neurological: She is alert and oriented to person, place, and time. She displays normal reflexes. No cranial nerve deficit or sensory deficit. She exhibits normal muscle tone. Coordination normal.   Nursing note and vitals reviewed.        Assessment/Plan   Cayla was seen today for back pain.    Diagnoses and all orders for this visit:    Acute left-sided low back pain without sciatica    Other orders  -     baclofen (LIORESAL) 10 MG tablet; Take 1 tablet by mouth 2 (Two) Times a Day As Needed for Muscle Spasms.  -     methylPREDNISolone (MEDROL, NERI,) 4 MG tablet; Take as directed on package instructions.    Continue light exercise.  Heating pad.  Tylenol over-the-counter.  Return if no better.  Otherwise as scheduled.  If pain is not improving 2 weeks return, if worse or footdrop to ER

## 2019-10-15 ENCOUNTER — OFFICE (OUTPATIENT)
Dept: URBAN - METROPOLITAN AREA CLINIC 42 | Facility: CLINIC | Age: 54
End: 2019-10-15

## 2019-10-15 VITALS
TEMPERATURE: 98 F | HEART RATE: 92 BPM | DIASTOLIC BLOOD PRESSURE: 95 MMHG | WEIGHT: 282 LBS | SYSTOLIC BLOOD PRESSURE: 143 MMHG | HEIGHT: 67 IN

## 2019-10-15 DIAGNOSIS — K21.9 GASTRO-ESOPHAGEAL REFLUX DISEASE WITHOUT ESOPHAGITIS: ICD-10-CM

## 2019-10-15 DIAGNOSIS — K22.2 ESOPHAGEAL OBSTRUCTION: ICD-10-CM

## 2019-10-15 PROCEDURE — 99213 OFFICE O/P EST LOW 20 MIN: CPT

## 2019-11-04 RX ORDER — BACLOFEN 10 MG/1
TABLET ORAL
Qty: 30 TABLET | Refills: 2 | Status: SHIPPED | OUTPATIENT
Start: 2019-11-04 | End: 2020-02-20

## 2019-11-21 ENCOUNTER — OFFICE VISIT (OUTPATIENT)
Dept: FAMILY MEDICINE CLINIC | Facility: CLINIC | Age: 54
End: 2019-11-21

## 2019-11-21 VITALS
SYSTOLIC BLOOD PRESSURE: 124 MMHG | OXYGEN SATURATION: 98 % | HEIGHT: 68 IN | TEMPERATURE: 98.4 F | DIASTOLIC BLOOD PRESSURE: 86 MMHG | HEART RATE: 99 BPM | WEIGHT: 280.6 LBS | BODY MASS INDEX: 42.53 KG/M2 | RESPIRATION RATE: 18 BRPM

## 2019-11-21 DIAGNOSIS — M54.16 LUMBAR RADICULOPATHY: ICD-10-CM

## 2019-11-21 DIAGNOSIS — F33.41 RECURRENT MAJOR DEPRESSIVE DISORDER, IN PARTIAL REMISSION (HCC): ICD-10-CM

## 2019-11-21 DIAGNOSIS — K21.9 GASTROESOPHAGEAL REFLUX DISEASE WITHOUT ESOPHAGITIS: ICD-10-CM

## 2019-11-21 DIAGNOSIS — E78.5 HYPERLIPIDEMIA, UNSPECIFIED HYPERLIPIDEMIA TYPE: Primary | ICD-10-CM

## 2019-11-21 DIAGNOSIS — E66.01 MORBID OBESITY WITH BMI OF 40.0-44.9, ADULT (HCC): ICD-10-CM

## 2019-11-21 PROCEDURE — 99213 OFFICE O/P EST LOW 20 MIN: CPT | Performed by: INTERNAL MEDICINE

## 2019-11-21 NOTE — PROGRESS NOTES
MACARENA@  Cayla Cole is a 53 y.o. female.     Chief Complaint   Patient presents with   • Hyperlipidemia     3 month follow up   GERD, chronic back pain, morbid obesity, depression  History of Present Illness   Seen here follow-up for cholesterol, chronic low back pain, GERD, depression.  Seeing psychiatrist for depression.  Recently had EGD done by Dr. Pabon was told to continue Prilosec.  Symptoms under control with the Prilosec.  Depression symptoms are under control.  Taking the baclofen on and off for low back pain and headaches.  Dieting and exercising.  the following portions of the patient's history were reviewed and updated as appropriate: allergies, current medications, past family history, past medical history, past social history, past surgical history and problem list.    Review of Systems   Constitutional: Negative for activity change and chills.   HENT: Negative for congestion.    Respiratory: Negative.    Cardiovascular: Negative.    Gastrointestinal: Negative.    Genitourinary: Negative for dysuria, flank pain, frequency and hematuria.   Musculoskeletal: Positive for back pain.   Neurological: Negative.    Psychiatric/Behavioral: Negative for agitation, behavioral problems and decreased concentration.       Allergies   Allergen Reactions   • Nsaids Anaphylaxis       Current Outpatient Medications on File Prior to Visit   Medication Sig Dispense Refill   • amitriptyline (ELAVIL) 50 MG tablet      • ARIPiprazole (ABILIFY) 10 MG tablet      • baclofen (LIORESAL) 10 MG tablet TAKE ONE TABLET BY MOUTH TWICE A DAY AS NEEDED FOR MUSCLE SPASMS 30 tablet 2   • buPROPion XL (WELLBUTRIN XL) 150 MG 24 hr tablet      • buPROPion XL (WELLBUTRIN XL) 300 MG 24 hr tablet      • cetirizine (zyrTEC) 10 MG tablet Take 10 mg by mouth Daily.     • clonazePAM (KlonoPIN) 0.5 MG tablet      • lamoTRIgine (LaMICtal) 100 MG tablet      • omeprazole (priLOSEC) 20 MG capsule Take 20 mg by mouth Daily.     •  venlafaxine XR (EFFEXOR-XR) 150 MG 24 hr capsule      • [DISCONTINUED] methylPREDNISolone (MEDROL, NERI,) 4 MG tablet Take as directed on package instructions. 1 each 0     No current facility-administered medications on file prior to visit.        Family History   Problem Relation Age of Onset   • Rheum arthritis Mother    • Kidney disease Mother    • Hypertension Father    • Dementia Father    • Heart murmur Sister    • Post-traumatic stress disorder Brother    • No Known Problems Son    • Cervical cancer Maternal Grandmother    • Aneurysm Maternal Grandmother    • No Known Problems Son        Past Medical History:   Diagnosis Date   • Anxiety    • Arthritis    • Depression    • Diverticulosis    • GERD (gastroesophageal reflux disease)    • Headache    • Kidney stone    • Low back pain    • Scoliosis        Past Surgical History:   Procedure Laterality Date   •  SECTION     • KNEE MENISCAL REPAIR Left    • LASER ABLATION OF THE CERVIX     • WISDOM TOOTH EXTRACTION         Social History     Socioeconomic History   • Marital status:      Spouse name: Not on file   • Number of children: Not on file   • Years of education: Not on file   • Highest education level: Not on file   Tobacco Use   • Smoking status: Never Smoker   • Smokeless tobacco: Never Used   Substance and Sexual Activity   • Alcohol use: Yes     Comment: OCCASION   • Drug use: No   • Sexual activity: Not Currently       Patient Active Problem List   Diagnosis   • Anxiety   • Lumbar radiculopathy   • Morbid obesity with BMI of 40.0-44.9, adult (CMS/HCC)   • Synovial cyst of lumbar facet joint   • Hyperlipidemia   • Gastroesophageal reflux disease without esophagitis   • Tension-type headache, not intractable   • Recurrent major depressive disorder, in partial remission (CMS/HCC)       /86 (BP Location: Left arm, Patient Position: Sitting, Cuff Size: Adult)   Pulse 99   Temp 98.4 °F (36.9 °C) (Oral)   Resp 18   Ht 171.5 cm  "(67.52\")   Wt 127 kg (280 lb 9.6 oz)   LMP  (LMP Unknown)   SpO2 98%   Breastfeeding? No   BMI 43.27 kg/m²   Body mass index is 43.27 kg/m².    Objective   Physical Exam   Constitutional: She is oriented to person, place, and time. She appears well-developed and well-nourished.   HENT:   Head: Normocephalic and atraumatic.   Eyes: EOM are normal. Pupils are equal, round, and reactive to light.   Neck: Normal range of motion. Neck supple. No JVD present. No tracheal deviation present. No thyromegaly present.   Cardiovascular: Normal rate, regular rhythm, normal heart sounds and intact distal pulses. Exam reveals no friction rub.   No murmur heard.  Pulmonary/Chest: Effort normal and breath sounds normal. No stridor. No respiratory distress. She has no wheezes. She has no rales. She exhibits no tenderness.   Abdominal: Soft. Bowel sounds are normal. She exhibits no distension and no mass. There is no tenderness. There is no rebound and no guarding.   Musculoskeletal: Normal range of motion. She exhibits no edema or deformity.   Lymphadenopathy:     She has no cervical adenopathy.   Neurological: She is alert and oriented to person, place, and time. She displays normal reflexes. No cranial nerve deficit. She exhibits normal muscle tone. Coordination normal.   Psychiatric:   Flat mood   Vitals reviewed.        Assessment/Plan   Cayla was seen today for hyperlipidemia.    Diagnoses and all orders for this visit:    Hyperlipidemia, unspecified hyperlipidemia type  -     CBC & Differential  -     CK  -     Comprehensive Metabolic Panel  -     Lipid Panel With / Chol / HDL Ratio    Morbid obesity with BMI of 40.0-44.9, adult (CMS/[facility])    Gastroesophageal reflux disease without esophagitis  -     CBC & Differential  -     CK  -     Comprehensive Metabolic Panel  -     Lipid Panel With / Chol / HDL Ratio    Recurrent major depressive disorder, in partial remission (CMS/[facility])    Lumbar radiculopathy    Continue all " current medication.  Patient is back on Lipitor.  Does not know the dose.  She was not on Lipitor last time.  Continue Prilosec.  Uses baclofen on and off.  Diet and exercise lose weight.  Return in 3 months time.  All her problems are chronic and stable except for depression she follows with psychiatrist.  On multiple medications for depression

## 2019-11-22 LAB
ALBUMIN SERPL-MCNC: 4.1 G/DL (ref 3.5–5.2)
ALBUMIN/GLOB SERPL: 1.6 G/DL
ALP SERPL-CCNC: 146 U/L (ref 39–117)
ALT SERPL-CCNC: 13 U/L (ref 1–33)
AST SERPL-CCNC: 14 U/L (ref 1–32)
BASOPHILS # BLD AUTO: 0.05 10*3/MM3 (ref 0–0.2)
BASOPHILS NFR BLD AUTO: 1 % (ref 0–1.5)
BILIRUB SERPL-MCNC: 0.2 MG/DL (ref 0.2–1.2)
BUN SERPL-MCNC: 15 MG/DL (ref 6–20)
BUN/CREAT SERPL: 15.3 (ref 7–25)
CALCIUM SERPL-MCNC: 9.2 MG/DL (ref 8.6–10.5)
CHLORIDE SERPL-SCNC: 101 MMOL/L (ref 98–107)
CHOLEST SERPL-MCNC: 153 MG/DL (ref 0–200)
CHOLEST/HDLC SERPL: 2.35 {RATIO}
CK SERPL-CCNC: 87 U/L (ref 20–180)
CO2 SERPL-SCNC: 28.6 MMOL/L (ref 22–29)
CREAT SERPL-MCNC: 0.98 MG/DL (ref 0.57–1)
EOSINOPHIL # BLD AUTO: 0.07 10*3/MM3 (ref 0–0.4)
EOSINOPHIL NFR BLD AUTO: 1.3 % (ref 0.3–6.2)
ERYTHROCYTE [DISTWIDTH] IN BLOOD BY AUTOMATED COUNT: 13.6 % (ref 12.3–15.4)
GLOBULIN SER CALC-MCNC: 2.5 GM/DL
GLUCOSE SERPL-MCNC: 96 MG/DL (ref 65–99)
HCT VFR BLD AUTO: 39.1 % (ref 34–46.6)
HDLC SERPL-MCNC: 65 MG/DL (ref 40–60)
HGB BLD-MCNC: 13.3 G/DL (ref 12–15.9)
IMM GRANULOCYTES # BLD AUTO: 0.01 10*3/MM3 (ref 0–0.05)
IMM GRANULOCYTES NFR BLD AUTO: 0.2 % (ref 0–0.5)
LDLC SERPL CALC-MCNC: 70 MG/DL (ref 0–100)
LYMPHOCYTES # BLD AUTO: 1.97 10*3/MM3 (ref 0.7–3.1)
LYMPHOCYTES NFR BLD AUTO: 37.5 % (ref 19.6–45.3)
MCH RBC QN AUTO: 28.4 PG (ref 26.6–33)
MCHC RBC AUTO-ENTMCNC: 34 G/DL (ref 31.5–35.7)
MCV RBC AUTO: 83.4 FL (ref 79–97)
MONOCYTES # BLD AUTO: 0.41 10*3/MM3 (ref 0.1–0.9)
MONOCYTES NFR BLD AUTO: 7.8 % (ref 5–12)
NEUTROPHILS # BLD AUTO: 2.74 10*3/MM3 (ref 1.7–7)
NEUTROPHILS NFR BLD AUTO: 52.2 % (ref 42.7–76)
NRBC BLD AUTO-RTO: 0 /100 WBC (ref 0–0.2)
PLATELET # BLD AUTO: 408 10*3/MM3 (ref 140–450)
POTASSIUM SERPL-SCNC: 4.4 MMOL/L (ref 3.5–5.2)
PROT SERPL-MCNC: 6.6 G/DL (ref 6–8.5)
RBC # BLD AUTO: 4.69 10*6/MM3 (ref 3.77–5.28)
SODIUM SERPL-SCNC: 139 MMOL/L (ref 136–145)
TRIGL SERPL-MCNC: 88 MG/DL (ref 0–150)
VLDLC SERPL CALC-MCNC: 17.6 MG/DL
WBC # BLD AUTO: 5.25 10*3/MM3 (ref 3.4–10.8)

## 2020-01-30 ENCOUNTER — OFFICE VISIT (OUTPATIENT)
Dept: FAMILY MEDICINE CLINIC | Facility: CLINIC | Age: 55
End: 2020-01-30

## 2020-01-30 VITALS
RESPIRATION RATE: 16 BRPM | BODY MASS INDEX: 44.73 KG/M2 | WEIGHT: 285 LBS | TEMPERATURE: 98.9 F | OXYGEN SATURATION: 97 % | HEART RATE: 87 BPM | SYSTOLIC BLOOD PRESSURE: 122 MMHG | HEIGHT: 67 IN | DIASTOLIC BLOOD PRESSURE: 80 MMHG

## 2020-01-30 DIAGNOSIS — E66.01 MORBID OBESITY WITH BMI OF 40.0-44.9, ADULT (HCC): ICD-10-CM

## 2020-01-30 DIAGNOSIS — G44.229 CHRONIC TENSION-TYPE HEADACHE, NOT INTRACTABLE: ICD-10-CM

## 2020-01-30 DIAGNOSIS — E78.5 HYPERLIPIDEMIA, UNSPECIFIED HYPERLIPIDEMIA TYPE: Primary | ICD-10-CM

## 2020-01-30 DIAGNOSIS — K21.9 GASTROESOPHAGEAL REFLUX DISEASE WITHOUT ESOPHAGITIS: ICD-10-CM

## 2020-01-30 PROCEDURE — 99213 OFFICE O/P EST LOW 20 MIN: CPT | Performed by: INTERNAL MEDICINE

## 2020-01-30 RX ORDER — OMEPRAZOLE 40 MG/1
CAPSULE, DELAYED RELEASE ORAL
COMMUNITY
Start: 2020-01-26

## 2020-01-30 NOTE — PROGRESS NOTES
MACARENA@  Cayla Cole is a 54 y.o. female.     Chief Complaint   Patient presents with   • Anxiety   • Depression   • Heartburn   • Hyperlipidemia       History of Present Illness   Patient follow-up for hyperlipidemia, chronic headache, GERD.  She sees a psychiatrist for anxiety and depression.  No chest pain or shortness of breath.  Taking Lipitor not listed on her medication.  She is also on Prilosec with the GERD symptoms controlled.  Depression is also under control with the multiple medications she is taking.  The following portions of the patient's history were reviewed and updated as appropriate: allergies, current medications, past family history, past medical history, past social history, past surgical history and problem list.    Review of Systems   Constitutional: Negative for activity change, appetite change, fatigue and fever.   Eyes: Negative for blurred vision and double vision.   Respiratory: Negative for shortness of breath.    Cardiovascular: Negative for chest pain, palpitations and leg swelling.   Gastrointestinal: Negative.    Neurological: Negative.  Negative for dizziness, syncope and light-headedness.       Allergies   Allergen Reactions   • Nsaids Anaphylaxis       Current Outpatient Medications on File Prior to Visit   Medication Sig Dispense Refill   • amitriptyline (ELAVIL) 50 MG tablet      • ARIPiprazole (ABILIFY) 10 MG tablet      • baclofen (LIORESAL) 10 MG tablet TAKE ONE TABLET BY MOUTH TWICE A DAY AS NEEDED FOR MUSCLE SPASMS 30 tablet 2   • buPROPion XL (WELLBUTRIN XL) 150 MG 24 hr tablet      • buPROPion XL (WELLBUTRIN XL) 300 MG 24 hr tablet      • cetirizine (zyrTEC) 10 MG tablet Take 10 mg by mouth Daily.     • clonazePAM (KlonoPIN) 0.5 MG tablet      • lamoTRIgine (LaMICtal) 100 MG tablet      • omeprazole (priLOSEC) 40 MG capsule      • venlafaxine XR (EFFEXOR-XR) 150 MG 24 hr capsule      • omeprazole (priLOSEC) 20 MG capsule Take 20 mg by mouth Daily.       No  "current facility-administered medications on file prior to visit.        Family History   Problem Relation Age of Onset   • Rheum arthritis Mother    • Kidney disease Mother    • Hypertension Father    • Dementia Father    • Heart murmur Sister    • Post-traumatic stress disorder Brother    • No Known Problems Son    • Cervical cancer Maternal Grandmother    • Aneurysm Maternal Grandmother    • No Known Problems Son        Past Medical History:   Diagnosis Date   • Anxiety    • Arthritis    • Depression    • Diverticulosis    • GERD (gastroesophageal reflux disease)    • Headache    • Kidney stone    • Low back pain    • Scoliosis        Past Surgical History:   Procedure Laterality Date   •  SECTION     • KNEE MENISCAL REPAIR Left    • LASER ABLATION OF THE CERVIX     • WISDOM TOOTH EXTRACTION         Social History     Socioeconomic History   • Marital status:      Spouse name: Not on file   • Number of children: Not on file   • Years of education: Not on file   • Highest education level: Not on file   Tobacco Use   • Smoking status: Never Smoker   • Smokeless tobacco: Never Used   Substance and Sexual Activity   • Alcohol use: Yes     Comment: OCCASION   • Drug use: No   • Sexual activity: Not Currently       Patient Active Problem List   Diagnosis   • Anxiety   • Lumbar radiculopathy   • Morbid obesity with BMI of 40.0-44.9, adult (CMS/HCC)   • Synovial cyst of lumbar facet joint   • Hyperlipidemia   • Gastroesophageal reflux disease without esophagitis   • Tension-type headache, not intractable   • Recurrent major depressive disorder, in partial remission (CMS/HCC)       /80 (BP Location: Right arm, Patient Position: Sitting, Cuff Size: Large Adult)   Pulse 87   Temp 98.9 °F (37.2 °C) (Oral)   Resp 16   Ht 170.2 cm (67\")   Wt 129 kg (285 lb)   SpO2 97%   BMI 44.64 kg/m²   Body mass index is 44.64 kg/m².    Objective   Physical Exam   Constitutional: She is oriented to person, place, " and time. She appears well-developed.   Neck: Normal range of motion. Neck supple. No JVD present. No tracheal deviation present. No thyromegaly present.   Cardiovascular: Normal rate, regular rhythm and intact distal pulses.   No murmur heard.  Pulmonary/Chest: Effort normal and breath sounds normal. No respiratory distress. She has no wheezes.   Abdominal: Soft. Bowel sounds are normal. She exhibits no distension and no mass. There is no tenderness. There is no guarding.   Musculoskeletal: She exhibits no edema.   Lymphadenopathy:     She has no cervical adenopathy.   Neurological: She is alert and oriented to person, place, and time. No cranial nerve deficit.   Psychiatric: She has a normal mood and affect. Her behavior is normal.   Nursing note and vitals reviewed.        Assessment/Plan   Cayla was seen today for anxiety, depression, heartburn and hyperlipidemia.    Diagnoses and all orders for this visit:    Hyperlipidemia, unspecified hyperlipidemia type    Morbid obesity with BMI of 40.0-44.9, adult (CMS/[facility])    Gastroesophageal reflux disease without esophagitis    Chronic tension-type headache, not intractable    Continue diet and exercise.  Continue medication.  Lose weight.  Continue Lipitor patient to call back with the dose.  Continue to follow-up with psychiatrist.  She is on the baclofen for chronic headaches.  Used to take Midrin before.  Return in 3 months time.  All her conditions are chronic and stable except for depression.  No labs needed as patient had labs done 2 months ago.

## 2020-01-31 RX ORDER — ATORVASTATIN CALCIUM 40 MG/1
40 TABLET, FILM COATED ORAL DAILY
COMMUNITY
End: 2020-11-08

## 2020-02-06 ENCOUNTER — TELEPHONE (OUTPATIENT)
Dept: FAMILY MEDICINE CLINIC | Facility: CLINIC | Age: 55
End: 2020-02-06

## 2020-02-06 NOTE — TELEPHONE ENCOUNTER
Pt called requesting rx for Butalbital-asa-caffine to be sent to [facility]. Per Dr Corral ok to call in a qty #20 [phone]

## 2020-02-20 RX ORDER — BACLOFEN 10 MG/1
TABLET ORAL
Qty: 30 TABLET | Refills: 2 | Status: SHIPPED | OUTPATIENT
Start: 2020-02-20 | End: 2020-04-22

## 2020-04-22 RX ORDER — BACLOFEN 10 MG/1
TABLET ORAL
Qty: 30 TABLET | Refills: 1 | Status: SHIPPED | OUTPATIENT
Start: 2020-04-22 | End: 2020-06-03

## 2020-05-21 ENCOUNTER — OFFICE VISIT (OUTPATIENT)
Dept: FAMILY MEDICINE CLINIC | Facility: CLINIC | Age: 55
End: 2020-05-21

## 2020-05-21 VITALS
WEIGHT: 279 LBS | BODY MASS INDEX: 43.79 KG/M2 | HEART RATE: 88 BPM | SYSTOLIC BLOOD PRESSURE: 138 MMHG | OXYGEN SATURATION: 98 % | HEIGHT: 67 IN | DIASTOLIC BLOOD PRESSURE: 82 MMHG | TEMPERATURE: 98.1 F

## 2020-05-21 DIAGNOSIS — E78.5 HYPERLIPIDEMIA, UNSPECIFIED HYPERLIPIDEMIA TYPE: Primary | ICD-10-CM

## 2020-05-21 DIAGNOSIS — K21.9 GASTROESOPHAGEAL REFLUX DISEASE WITHOUT ESOPHAGITIS: ICD-10-CM

## 2020-05-21 DIAGNOSIS — F41.9 ANXIETY: ICD-10-CM

## 2020-05-21 DIAGNOSIS — F33.41 RECURRENT MAJOR DEPRESSIVE DISORDER, IN PARTIAL REMISSION (HCC): ICD-10-CM

## 2020-05-21 DIAGNOSIS — E66.01 MORBID OBESITY WITH BMI OF 40.0-44.9, ADULT (HCC): ICD-10-CM

## 2020-05-21 LAB
ALBUMIN SERPL-MCNC: 4 G/DL (ref 3.5–5.2)
ALBUMIN/GLOB SERPL: 1.7 G/DL
ALP SERPL-CCNC: 143 U/L (ref 39–117)
ALT SERPL-CCNC: 16 U/L (ref 1–33)
AST SERPL-CCNC: 13 U/L (ref 1–32)
BILIRUB SERPL-MCNC: 0.4 MG/DL (ref 0.2–1.2)
BUN SERPL-MCNC: 7 MG/DL (ref 6–20)
BUN/CREAT SERPL: 7.5 (ref 7–25)
CALCIUM SERPL-MCNC: 8.9 MG/DL (ref 8.6–10.5)
CHLORIDE SERPL-SCNC: 103 MMOL/L (ref 98–107)
CHOLEST SERPL-MCNC: 158 MG/DL (ref 0–200)
CHOLEST/HDLC SERPL: 2.43 {RATIO}
CK SERPL-CCNC: 92 U/L (ref 20–180)
CO2 SERPL-SCNC: 24.6 MMOL/L (ref 22–29)
CREAT SERPL-MCNC: 0.93 MG/DL (ref 0.57–1)
GLOBULIN SER CALC-MCNC: 2.4 GM/DL
GLUCOSE SERPL-MCNC: 94 MG/DL (ref 65–99)
HDLC SERPL-MCNC: 65 MG/DL (ref 40–60)
LDLC SERPL CALC-MCNC: 71 MG/DL (ref 0–100)
POTASSIUM SERPL-SCNC: 4.2 MMOL/L (ref 3.5–5.2)
PROT SERPL-MCNC: 6.4 G/DL (ref 6–8.5)
SODIUM SERPL-SCNC: 138 MMOL/L (ref 136–145)
TRIGL SERPL-MCNC: 108 MG/DL (ref 0–150)
VLDLC SERPL CALC-MCNC: 21.6 MG/DL

## 2020-05-21 PROCEDURE — 99213 OFFICE O/P EST LOW 20 MIN: CPT | Performed by: INTERNAL MEDICINE

## 2020-05-21 NOTE — PROGRESS NOTES
Subjective   Cayla Cole is a 54 y.o. female.     Chief Complaint   Patient presents with   • Hyperlipidemia   GERD, chronic headaches, depression.  Morbid obesity.    History of Present Illness   Patient here follow-up for hyperlipidemia, GERD, chronic headaches, morbid obesity, depression.  She is followed by psychiatrist for depression she is on multiple medications counted about 5 of them and she is taking them all.  I am giving her Prilosec, Lipitor 40, and as needed baclofen.  Reports her headaches are under control.  Blood pressure is under control at home.  She is dieting and exercising.  Not lost weight.  No chest pain, shortness of breath, fever chills cough congestion.  No sore throat.    The following portions of the patient's history were reviewed and updated as appropriate: allergies, current medications, past family history, past medical history, past social history, past surgical history and problem list.    Review of Systems   Constitutional: Negative for activity change, appetite change, fatigue and fever.   Eyes: Negative for blurred vision and double vision.   Respiratory: Negative.  Negative for shortness of breath.    Cardiovascular: Negative for chest pain, palpitations and leg swelling.   Gastrointestinal: Negative.    Genitourinary: Negative for dyspareunia, dysuria, flank pain and hematuria.   Neurological: Negative.  Negative for dizziness, syncope and light-headedness.   Psychiatric/Behavioral: Negative for dysphoric mood, suicidal ideas and depressed mood.        Depression symptoms stable and under control with medications       Allergies   Allergen Reactions   • Nsaids Anaphylaxis       Current Outpatient Medications on File Prior to Visit   Medication Sig Dispense Refill   • amitriptyline (ELAVIL) 50 MG tablet      • ARIPiprazole (ABILIFY) 10 MG tablet      • atorvastatin (LIPITOR) 40 MG tablet Take 40 mg by mouth Daily.     • baclofen (LIORESAL) 10 MG tablet TAKE ONE TABLET BY  MOUTH TWICE A DAY AS NEEDED FOR MUSCLE SPASMS 30 tablet 1   • buPROPion XL (WELLBUTRIN XL) 150 MG 24 hr tablet      • buPROPion XL (WELLBUTRIN XL) 300 MG 24 hr tablet      • cetirizine (zyrTEC) 10 MG tablet Take 10 mg by mouth Daily.     • clonazePAM (KlonoPIN) 0.5 MG tablet      • lamoTRIgine (LaMICtal) 100 MG tablet      • omeprazole (priLOSEC) 40 MG capsule      • venlafaxine XR (EFFEXOR-XR) 150 MG 24 hr capsule      • omeprazole (priLOSEC) 20 MG capsule Take 20 mg by mouth Daily.       No current facility-administered medications on file prior to visit.        Family History   Problem Relation Age of Onset   • Rheum arthritis Mother    • Kidney disease Mother    • Hypertension Father    • Dementia Father    • Heart murmur Sister    • Post-traumatic stress disorder Brother    • No Known Problems Son    • Cervical cancer Maternal Grandmother    • Aneurysm Maternal Grandmother    • No Known Problems Son        Past Medical History:   Diagnosis Date   • Allergic    • Anxiety    • Arthritis    • Depression    • Diverticulosis    • GERD (gastroesophageal reflux disease)    • Headache    • Kidney stone    • Low back pain    • Scoliosis        Past Surgical History:   Procedure Laterality Date   •  SECTION     • KNEE MENISCAL REPAIR Left    • LASER ABLATION OF THE CERVIX     • WISDOM TOOTH EXTRACTION         Social History     Socioeconomic History   • Marital status:      Spouse name: Not on file   • Number of children: Not on file   • Years of education: Not on file   • Highest education level: Not on file   Tobacco Use   • Smoking status: Never Smoker   • Smokeless tobacco: Never Used   Substance and Sexual Activity   • Alcohol use: Yes     Comment: OCCASION   • Drug use: No   • Sexual activity: Not Currently       Patient Active Problem List   Diagnosis   • Anxiety   • Lumbar radiculopathy   • Morbid obesity with BMI of 40.0-44.9, adult (CMS/[facility])   • Synovial cyst of lumbar facet joint   •  "Hyperlipidemia   • Gastroesophageal reflux disease without esophagitis   • Tension-type headache, not intractable   • Recurrent major depressive disorder, in partial remission (CMS/[facility])       /82 (BP Location: Right arm, Patient Position: Sitting, Cuff Size: Adult)   Pulse 88   Temp 98.1 °F (36.7 °C) (Tympanic)   Ht 170.2 cm (67\")   Wt 127 kg (279 lb)   SpO2 98%   BMI 43.70 kg/m²   Body mass index is 43.7 kg/m².    Objective   Physical Exam   Constitutional: She is oriented to person, place, and time. She appears well-developed.   Eyes: Pupils are equal, round, and reactive to light.   Neck: Normal range of motion. Neck supple. No JVD present. No tracheal deviation present. No thyromegaly present.   Cardiovascular: Normal rate, regular rhythm and intact distal pulses.   No murmur heard.  Pulmonary/Chest: Effort normal and breath sounds normal. No respiratory distress. She has no wheezes.   Abdominal: Soft. Bowel sounds are normal. She exhibits no distension and no mass. There is no tenderness. There is no rebound and no guarding. No hernia.   Musculoskeletal: She exhibits no edema.   Lymphadenopathy:     She has no cervical adenopathy.   Neurological: She is alert and oriented to person, place, and time. She displays normal reflexes. No cranial nerve deficit or sensory deficit. She exhibits normal muscle tone. Coordination normal.   Psychiatric: She has a normal mood and affect. Her behavior is normal.   Nursing note and vitals reviewed.        Assessment/Plan   Cayla was seen today for hyperlipidemia.    Diagnoses and all orders for this visit:    Hyperlipidemia, unspecified hyperlipidemia type  -     Comprehensive Metabolic Panel  -     Lipid Panel With / Chol / HDL Ratio  -     CK    Morbid obesity with BMI of 40.0-44.9, adult (CMS/[facility])  -     Comprehensive Metabolic Panel  -     Lipid Panel With / Chol / HDL Ratio  -     CK    Gastroesophageal reflux disease without esophagitis  -     Comprehensive " Metabolic Panel  -     Lipid Panel With / Chol / HDL Ratio  -     CK    Anxiety  -     Comprehensive Metabolic Panel  -     Lipid Panel With / Chol / HDL Ratio  -     CK    Recurrent major depressive disorder, in partial remission (CMS/HCC)  -     Comprehensive Metabolic Panel  -     Lipid Panel With / Chol / HDL Ratio  -     CK    Continue follow-up with psychiatrist.  Continue multiple medication for depression anxiety.  Continue Lipitor, Prilosec, take baclofen as needed try to get off that.  Diet and exercise lose weight.  Check blood pressure.  All her conditions are chronic and stable.  Awaiting labs.  Patient to come back in 3 months time.  Reports she is up-to-date on her colonoscopy and mammogram.

## 2020-06-03 RX ORDER — BACLOFEN 10 MG/1
TABLET ORAL
Qty: 30 TABLET | Refills: 3 | Status: SHIPPED | OUTPATIENT
Start: 2020-06-03 | End: 2020-08-18

## 2020-08-13 ENCOUNTER — OFFICE VISIT (OUTPATIENT)
Dept: FAMILY MEDICINE CLINIC | Facility: CLINIC | Age: 55
End: 2020-08-13

## 2020-08-13 VITALS
BODY MASS INDEX: 44.1 KG/M2 | DIASTOLIC BLOOD PRESSURE: 80 MMHG | HEIGHT: 67 IN | SYSTOLIC BLOOD PRESSURE: 126 MMHG | OXYGEN SATURATION: 97 % | HEART RATE: 94 BPM | TEMPERATURE: 97.3 F | WEIGHT: 281 LBS | RESPIRATION RATE: 16 BRPM

## 2020-08-13 DIAGNOSIS — K21.9 GASTROESOPHAGEAL REFLUX DISEASE WITHOUT ESOPHAGITIS: ICD-10-CM

## 2020-08-13 DIAGNOSIS — L72.9 SCALP CYST: ICD-10-CM

## 2020-08-13 DIAGNOSIS — F33.41 RECURRENT MAJOR DEPRESSIVE DISORDER, IN PARTIAL REMISSION (HCC): ICD-10-CM

## 2020-08-13 DIAGNOSIS — F41.9 ANXIETY: ICD-10-CM

## 2020-08-13 DIAGNOSIS — E78.5 HYPERLIPIDEMIA, UNSPECIFIED HYPERLIPIDEMIA TYPE: ICD-10-CM

## 2020-08-13 DIAGNOSIS — K22.2 ESOPHAGEAL STRICTURE: ICD-10-CM

## 2020-08-13 DIAGNOSIS — Z12.31 ENCOUNTER FOR SCREENING MAMMOGRAM FOR MALIGNANT NEOPLASM OF BREAST: Primary | ICD-10-CM

## 2020-08-13 DIAGNOSIS — E66.01 MORBID OBESITY WITH BMI OF 40.0-44.9, ADULT (HCC): ICD-10-CM

## 2020-08-13 DIAGNOSIS — G44.229 CHRONIC TENSION-TYPE HEADACHE, NOT INTRACTABLE: ICD-10-CM

## 2020-08-13 PROCEDURE — 99214 OFFICE O/P EST MOD 30 MIN: CPT | Performed by: INTERNAL MEDICINE

## 2020-08-13 NOTE — PROGRESS NOTES
MACARENA@  Cayla Cole is a 54 y.o. female.     Chief Complaint   Patient presents with   • Hyperlipidemia   • Heartburn   • Mass     top of head   Morbid obesity, depression, GERD    History of Present Illness   Here follow-up for hyperlipidemia, GERD, morbid obesity, depression.  Patient taking Prilosec that is helping her GERD symptoms.  She had a EGD done last year by Dr. Pabon and is doing well.  She had was found with stricture.  Prilosec is helping her symptoms.  Continues to diet and exercise to lose weight.  Taking Lipitor and dieting.  She is follows with a psychiatrist they are changing and adjusting her medication.  Amitriptyline was DC'd.  Continues to be on the Lipitor, baclofen for chronic headaches, Prilosec 40,, then she is on several psychiatric medication including Effexor Abilify, Lamictal Wellbutrin.  She is also on Klonopin.  No SI or HI.  Complains of a knot on her scalp noticed couple weeks ago.  No changes  The following portions of the patient's history were reviewed and updated as appropriate: allergies, current medications, past family history, past medical history, past social history, past surgical history and problem list.    Review of Systems   Constitutional: Negative for activity change, appetite change, fatigue and fever.   Eyes: Negative for blurred vision and double vision.   Respiratory: Negative.  Negative for shortness of breath.    Cardiovascular: Negative for chest pain, palpitations and leg swelling.   Gastrointestinal: Negative.    Musculoskeletal: Negative for arthralgias and back pain.   Skin: Negative for color change and dry skin.   Neurological: Negative for dizziness, syncope, light-headedness and headache.   Psychiatric/Behavioral: Negative for suicidal ideas.       Allergies   Allergen Reactions   • Nsaids Anaphylaxis       Current Outpatient Medications on File Prior to Visit   Medication Sig Dispense Refill   • ARIPiprazole (ABILIFY) 10 MG tablet       • atorvastatin (LIPITOR) 40 MG tablet Take 40 mg by mouth Daily.     • baclofen (LIORESAL) 10 MG tablet TAKE ONE TABLET BY MOUTH TWICE A DAY AS NEEDED FOR MUSCLE SPASMS 30 tablet 3   • buPROPion XL (WELLBUTRIN XL) 150 MG 24 hr tablet      • buPROPion XL (WELLBUTRIN XL) 300 MG 24 hr tablet      • cetirizine (zyrTEC) 10 MG tablet Take 10 mg by mouth Daily.     • clonazePAM (KlonoPIN) 0.5 MG tablet      • lamoTRIgine (LaMICtal) 100 MG tablet      • omeprazole (priLOSEC) 40 MG capsule      • venlafaxine XR (EFFEXOR-XR) 150 MG 24 hr capsule      • amitriptyline (ELAVIL) 50 MG tablet      • omeprazole (priLOSEC) 20 MG capsule Take 20 mg by mouth Daily.       No current facility-administered medications on file prior to visit.        Family History   Problem Relation Age of Onset   • Rheum arthritis Mother    • Kidney disease Mother    • Hypertension Father    • Dementia Father    • Heart murmur Sister    • Post-traumatic stress disorder Brother    • No Known Problems Son    • Cervical cancer Maternal Grandmother    • Aneurysm Maternal Grandmother    • No Known Problems Son        Past Medical History:   Diagnosis Date   • Allergic    • Anxiety    • Arthritis    • Depression    • Diverticulosis    • GERD (gastroesophageal reflux disease)    • Headache    • Kidney stone    • Low back pain    • Scoliosis        Past Surgical History:   Procedure Laterality Date   •  SECTION     • KNEE MENISCAL REPAIR Left    • LASER ABLATION OF THE CERVIX     • WISDOM TOOTH EXTRACTION         Social History     Socioeconomic History   • Marital status:      Spouse name: Not on file   • Number of children: Not on file   • Years of education: Not on file   • Highest education level: Not on file   Tobacco Use   • Smoking status: Never Smoker   • Smokeless tobacco: Never Used   Substance and Sexual Activity   • Alcohol use: Yes     Comment: OCCASION   • Drug use: No   • Sexual activity: Not Currently       Patient Active Problem  "List   Diagnosis   • Anxiety   • Lumbar radiculopathy   • Morbid obesity with BMI of 40.0-44.9, adult (CMS/[facility])   • Synovial cyst of lumbar facet joint   • Hyperlipidemia   • Gastroesophageal reflux disease without esophagitis   • Tension-type headache, not intractable   • Recurrent major depressive disorder, in partial remission (CMS/[facility])   • Esophageal stricture       /80 (BP Location: Left arm, Patient Position: Sitting, Cuff Size: Large Adult)   Pulse 94   Temp 97.3 °F (36.3 °C) (Temporal)   Resp 16   Ht 170.2 cm (67\")   Wt 127 kg (281 lb)   SpO2 97%   BMI 44.01 kg/m²   Body mass index is 44.01 kg/m².    Objective   Physical Exam   Constitutional: She is oriented to person, place, and time. She appears well-developed.   HENT:   Head: Normocephalic and atraumatic.   Scalp with a 0.5 cm soft, mobile round not most likely feels like a cyst   Eyes: Pupils are equal, round, and reactive to light.   Neck: Normal range of motion. Neck supple. No JVD present. No tracheal deviation present. No thyromegaly present.   Cardiovascular: Normal rate, regular rhythm and intact distal pulses.   No murmur heard.  Pulmonary/Chest: Effort normal and breath sounds normal. No respiratory distress. She has no wheezes.   Abdominal: Soft. Bowel sounds are normal. She exhibits no distension and no mass. There is no tenderness. There is no rebound and no guarding. No hernia.   Musculoskeletal: She exhibits no edema.   Lymphadenopathy:     She has no cervical adenopathy.   Neurological: She is alert and oriented to person, place, and time. She displays normal reflexes. No cranial nerve deficit or sensory deficit. She exhibits normal muscle tone. Coordination normal.   Skin: Skin is dry.   Psychiatric: She has a normal mood and affect. Her behavior is normal.   Nursing note and vitals reviewed.        Assessment/Plan   Cayla was seen today for hyperlipidemia, heartburn and mass.    Diagnoses and all orders for this " visit:    Encounter for screening mammogram for malignant neoplasm of breast  -     Mammo Screening Digital Tomosynthesis Bilateral With CAD  -     Comprehensive metabolic panel  -     Lipid Panel w/ Chol/HDL Ratio  -     CK  -     CBC & Differential    Hyperlipidemia, unspecified hyperlipidemia type  -     Comprehensive metabolic panel  -     Lipid Panel w/ Chol/HDL Ratio  -     CK  -     CBC & Differential    Morbid obesity with BMI of 40.0-44.9, adult (CMS/[facility])    Gastroesophageal reflux disease without esophagitis  -     Comprehensive metabolic panel  -     Lipid Panel w/ Chol/HDL Ratio  -     CK  -     CBC & Differential    Anxiety  -     Comprehensive metabolic panel  -     Lipid Panel w/ Chol/HDL Ratio  -     CK  -     CBC & Differential    Chronic tension-type headache, not intractable    Recurrent major depressive disorder, in partial remission (CMS/[facility])  -     Comprehensive metabolic panel  -     Lipid Panel w/ Chol/HDL Ratio  -     CK  -     CBC & Differential    Esophageal stricture    Scalp cyst    The knot on her scalp most likely is at the cyst.  Discussed with patient it is nontender, it is no red or inflammation watch it if it grows in size need to see a surgeon to take it out.  Continue diet and exercise.  Continue all Lipitor, Prilosec, baclofen.  Her headaches are also under stable condition.  Lose weight.  Check blood pressure.  Keep follow-up with psychiatrist.  All her problems are chronic and stable.  Except for above-mentioned.  Return in 3 months time.

## 2020-08-14 LAB
ALBUMIN SERPL-MCNC: 4 G/DL (ref 3.5–5.2)
ALBUMIN/GLOB SERPL: 2.1 G/DL
ALP SERPL-CCNC: 141 U/L (ref 39–117)
ALT SERPL-CCNC: 16 U/L (ref 1–33)
AST SERPL-CCNC: 14 U/L (ref 1–32)
BASOPHILS # BLD AUTO: 0.05 10*3/MM3 (ref 0–0.2)
BASOPHILS NFR BLD AUTO: 1.1 % (ref 0–1.5)
BILIRUB SERPL-MCNC: 0.3 MG/DL (ref 0–1.2)
BUN SERPL-MCNC: 13 MG/DL (ref 6–20)
BUN/CREAT SERPL: 13 (ref 7–25)
CALCIUM SERPL-MCNC: 8.8 MG/DL (ref 8.6–10.5)
CHLORIDE SERPL-SCNC: 102 MMOL/L (ref 98–107)
CHOLEST SERPL-MCNC: 147 MG/DL (ref 0–200)
CHOLEST/HDLC SERPL: 2.1 {RATIO}
CK SERPL-CCNC: 104 U/L (ref 20–180)
CO2 SERPL-SCNC: 27.4 MMOL/L (ref 22–29)
CREAT SERPL-MCNC: 1 MG/DL (ref 0.57–1)
EOSINOPHIL # BLD AUTO: 0.15 10*3/MM3 (ref 0–0.4)
EOSINOPHIL NFR BLD AUTO: 3.3 % (ref 0.3–6.2)
ERYTHROCYTE [DISTWIDTH] IN BLOOD BY AUTOMATED COUNT: 13.1 % (ref 12.3–15.4)
GLOBULIN SER CALC-MCNC: 1.9 GM/DL
GLUCOSE SERPL-MCNC: 99 MG/DL (ref 65–99)
HCT VFR BLD AUTO: 37.5 % (ref 34–46.6)
HDLC SERPL-MCNC: 70 MG/DL (ref 40–60)
HGB BLD-MCNC: 12.7 G/DL (ref 12–15.9)
IMM GRANULOCYTES # BLD AUTO: 0.01 10*3/MM3 (ref 0–0.05)
IMM GRANULOCYTES NFR BLD AUTO: 0.2 % (ref 0–0.5)
LDLC SERPL CALC-MCNC: 62 MG/DL (ref 0–100)
LYMPHOCYTES # BLD AUTO: 1.63 10*3/MM3 (ref 0.7–3.1)
LYMPHOCYTES NFR BLD AUTO: 35.6 % (ref 19.6–45.3)
MCH RBC QN AUTO: 28.1 PG (ref 26.6–33)
MCHC RBC AUTO-ENTMCNC: 33.9 G/DL (ref 31.5–35.7)
MCV RBC AUTO: 83 FL (ref 79–97)
MONOCYTES # BLD AUTO: 0.35 10*3/MM3 (ref 0.1–0.9)
MONOCYTES NFR BLD AUTO: 7.6 % (ref 5–12)
NEUTROPHILS # BLD AUTO: 2.39 10*3/MM3 (ref 1.7–7)
NEUTROPHILS NFR BLD AUTO: 52.2 % (ref 42.7–76)
NRBC BLD AUTO-RTO: 0 /100 WBC (ref 0–0.2)
PLATELET # BLD AUTO: 282 10*3/MM3 (ref 140–450)
POTASSIUM SERPL-SCNC: 4.2 MMOL/L (ref 3.5–5.2)
PROT SERPL-MCNC: 5.9 G/DL (ref 6–8.5)
RBC # BLD AUTO: 4.52 10*6/MM3 (ref 3.77–5.28)
SODIUM SERPL-SCNC: 137 MMOL/L (ref 136–145)
TRIGL SERPL-MCNC: 74 MG/DL (ref 0–150)
VLDLC SERPL CALC-MCNC: 14.8 MG/DL
WBC # BLD AUTO: 4.58 10*3/MM3 (ref 3.4–10.8)

## 2020-08-18 RX ORDER — BACLOFEN 10 MG/1
TABLET ORAL
Qty: 30 TABLET | Refills: 2 | Status: SHIPPED | OUTPATIENT
Start: 2020-08-18

## 2020-08-20 ENCOUNTER — HOSPITAL ENCOUNTER (EMERGENCY)
Facility: HOSPITAL | Age: 55
Discharge: HOME | End: 2020-08-20
Attending: EMERGENCY MEDICINE | Admitting: EMERGENCY MEDICINE

## 2020-08-20 VITALS
HEART RATE: 100 BPM | HEIGHT: 66 IN | WEIGHT: 271 LBS | OXYGEN SATURATION: 96 % | BODY MASS INDEX: 43.55 KG/M2 | RESPIRATION RATE: 18 BRPM | TEMPERATURE: 97.3 F | DIASTOLIC BLOOD PRESSURE: 105 MMHG | SYSTOLIC BLOOD PRESSURE: 159 MMHG
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
## 2020-08-20 DIAGNOSIS — S61.451A CAT BITE OF RIGHT HAND, INITIAL ENCOUNTER: Primary | ICD-10-CM

## 2020-08-20 PROBLEM — Z23 NEED FOR VACCINATION: Status: ACTIVE | Noted: 2020-08-20

## 2020-08-20 PROCEDURE — 90375 RABIES IG IM/SC: CPT | Performed by: PHYSICIAN ASSISTANT

## 2020-08-20 PROCEDURE — 96372 THER/PROPH/DIAG INJ SC/IM: CPT

## 2020-08-20 PROCEDURE — 90471 IMMUNIZATION ADMIN: CPT

## 2020-08-20 PROCEDURE — 25010000002 RABIES IMMUNE GLOBULIN PER 150 INT'L UNITS: Performed by: PHYSICIAN ASSISTANT

## 2020-08-20 PROCEDURE — 25010000002 RABIES IMMUNE GLOBULIN 300 UNIT/ML SOLUTION 1 ML VIAL: Performed by: PHYSICIAN ASSISTANT

## 2020-08-20 PROCEDURE — 99282 EMERGENCY DEPT VISIT SF MDM: CPT

## 2020-08-20 PROCEDURE — 25010000002 RABIES VACCINE PER 1 ML: Performed by: PHYSICIAN ASSISTANT

## 2020-08-20 PROCEDURE — 90675 RABIES VACCINE IM: CPT | Performed by: PHYSICIAN ASSISTANT

## 2020-08-20 RX ADMIN — RABIES IMMUNE GLOBULIN (HUMAN) 2400 UNITS: 300 INJECTION, SOLUTION INFILTRATION; INTRAMUSCULAR at 13:23

## 2020-08-20 RX ADMIN — RABIES VACCINE 2.5 UNITS: KIT at 13:18

## 2020-08-20 NOTE — ED NOTES
Patient masked in triage and taken to room.  Patient states she need the Rabies Vaccine.  Patient was bite by a cat at 0230hrs this am.  Patient was seen at [facility].     Neto Mills RN  08/20/20 [id number]

## 2020-08-20 NOTE — ED NOTES
Pt was bit on right hand this am. Stray cat and home cat were about to fight when she pushed them apart. Pt states that stray cat bit hand. Pt has puncture marks to right hand.    Pt had mask on when placed in room. RN wore goggles and surgical mask upon entering room.      Sadaf Ware, RN  08/20/20 [id number]

## 2020-08-20 NOTE — DISCHARGE INSTRUCTIONS
Take the Augmentin until completed.  Return to the ambulatory care unit for your repeat doses of rabies vaccine on 8/23/2020, 8/27/2020, and 9/3/2020.  He can follow-up with a hand surgeon listed above if needed for any concerns or worsening symptoms.  If you develop a fever, severe swelling/redness, red streaking from the area, vomiting and cannot keep your meds down, any concerns you can return to the ER for repeat evaluation.

## 2020-08-20 NOTE — ED PROVIDER NOTES
EMERGENCY DEPARTMENT ENCOUNTER    Room Number:  41/41  Date seen:  8/20/2020  Time seen: 12:37 PM  PCP: Artemio Corral MD  Historian: patient      HPI:  Chief Complaint: right hand pain    A complete HPI/ROS/PMH/PSH/SH/FH are unobtainable due to: none    Context: Cayla Cole is a 54 y.o. female who presents to the ED for evaluation of pain in her right hand that onset around 230 this morning and was sudden, constant, mild to moderate, and palpation range of motion seem to make it worse, nothing really makes it better.  It began when she was bitten in the area by a stray cat.  This cat had come into her yard and her own cat was out as well and they started to have an altercation and she pushed the stray cat away with her right hand and it bit her.  Her last tetanus vaccine was last year, she went to urgent care for this problem initially and was prescribed Augmentin and referred to the ER for rabies vaccination.  Denies any numbness tingling or functional deficit from her injury.        PAST MEDICAL HISTORY  Active Ambulatory Problems     Diagnosis Date Noted   • Anxiety 05/09/2019   • Lumbar radiculopathy 02/01/2019   • Morbid obesity with BMI of 40.0-44.9, adult (CMS/[facility]) 02/01/2019   • Synovial cyst of lumbar facet joint 02/01/2019   • Hyperlipidemia 05/09/2019   • Gastroesophageal reflux disease without esophagitis 05/09/2019   • Tension-type headache, not intractable 05/09/2019   • Recurrent major depressive disorder, in partial remission (CMS/[facility]) 05/09/2019   • Esophageal stricture 08/13/2020   • Need for vaccination 08/20/2020     Resolved Ambulatory Problems     Diagnosis Date Noted   • No Resolved Ambulatory Problems     Past Medical History:   Diagnosis Date   • Allergic    • Arthritis    • Depression    • Diverticulosis    • GERD (gastroesophageal reflux disease)    • Headache    • Kidney stone    • Low back pain    • Scoliosis          PAST SURGICAL HISTORY  Past Surgical History:   Procedure  Laterality Date   •  SECTION     • KNEE MENISCAL REPAIR Left    • LASER ABLATION OF THE CERVIX     • WISDOM TOOTH EXTRACTION           FAMILY HISTORY  Family History   Problem Relation Age of Onset   • Rheum arthritis Mother    • Kidney disease Mother    • Hypertension Father    • Dementia Father    • Heart murmur Sister    • Post-traumatic stress disorder Brother    • No Known Problems Son    • Cervical cancer Maternal Grandmother    • Aneurysm Maternal Grandmother    • No Known Problems Son          SOCIAL HISTORY  Social History     Socioeconomic History   • Marital status:      Spouse name: Not on file   • Number of children: Not on file   • Years of education: Not on file   • Highest education level: Not on file   Tobacco Use   • Smoking status: Never Smoker   • Smokeless tobacco: Never Used   Substance and Sexual Activity   • Alcohol use: Yes     Comment: OCCASION   • Drug use: No   • Sexual activity: Not Currently         ALLERGIES  Nsaids        REVIEW OF SYSTEMS  Review of Systems     All systems reviewed and negative except for those discussed in HPI.       PHYSICAL EXAM  ED Triage Vitals   Temp Heart Rate Resp BP SpO2   20 1130 20 1130 20 1130 20 1136 20 1130   97.3 °F (36.3 °C) 116 16 (!) 159/105 96 %      Temp src Heart Rate Source Patient Position BP Location FiO2 (%)   20 1130 20 1130 -- -- --   Tympanic Monitor            GENERAL: not distressed  HENT: atraumatic  EYES: no scleral icterus  CV:  regular rate  RESPIRATORY: normal effort  ABDOMEN: Nondistended  MUSCULOSKELETAL: no deformity.  There are 2 puncture wounds over the dorsum of the right hand in the area of the with some surrounding edema and mild ecchymosis, no erythema.  She has 5 out of 5 strength with extension and flexion of all fingers, sensation and function intact in radial ulnar and median nerve distributions.  Cap refill is brisk, radial pulses 2+.  NEURO: alert, moves all  extremities, follows commands  SKIN: warm, dry    Vital signs and nursing notes reviewed.          MEDICATIONS GIVEN IN ER  Medications   rabies vaccine, PCEC (RABAVERT) injection 2.5 Units (2.5 Units Intramuscular Given 8/20/20 1318)   rabies Immune Globulin (HYPERRAB) 2,400 Units 8 mL injection (2,400 Units Intramuscular Given 8/20/20 1323)             PROGRESS AND CONSULTS    DDX includes but not limited to puncture, abrasion, infection    ED Course as of Aug 20 2014   Thu Aug 20, 2020   1237 Discussed the patient with our ER pharmacist, Maddison, who will order the rabies immunoglobulin and vaccine and counseled the patient on them and follow-up.    [KA]   1353 Medical chart reviewed.  Last Tdap administered on 5/23/2019.    [KA]      ED Course User Index  [KA] Christa Valle PA   Arrangements have been made for the patient to follow-up in the ambulatory care unit for repeat rabies vaccine dosages.  She understands the plan and is stable for discharge.     Reviewed pt's history and workup with Dr. Perry.  After a bedside evaluation; they agree with the plan of care      Patient was placed in face mask in first look. Patient was wearing facemask each time I entered the room and throughout our encounter. I wore protective equipment throughout this patient encounter including a face mask, eye shield and gloves. Hand hygiene was performed before donning protective equipment and after removal when leaving the room.        DIAGNOSIS  Final diagnoses:   Cat bite of right hand, initial encounter               Latest Documented Vital Signs:  As of 20:14  BP- (!) 159/105 HR- 100 Temp- 97.3 °F (36.3 °C) (Tympanic) O2 sat- 96%       Christa Valle PA  08/20/20 2015

## 2020-08-20 NOTE — ED PROVIDER NOTES
Pt is a 54 y.o. female who presents to the ED complaining of right hand pain after a cat bite that occurred at 0230 this am. Pt was seen at  earlier and given Rx for Augmentin. She was sent here for a rabies vaccine. Pt states her tetanus is UTD.     On exam,  Cardiovascular: heart RRR, no murmur  Pulmonary: lungs CTAB  Skin: 2 puncture wounds on right hand along the 2nd metacarpal and web space of right hand. There is some mild swelling after injections of antibodies for rabies.     Plan: Discharge home      Patient was placed in face mask in first look. Patient was wearing facemask when I entered the room and throughout our encounter. I wore full protective equipment throughout this patient encounter including a face mask, eye shield and gloves. Hand hygiene was performed before donning protective equipment and after removal when leaving the room.      MD ATTESTATION NOTE    The CYNDEE and I have discussed this patient's history, physical exam, and treatment plan.  I have reviewed the documentation and personally had a face to face interaction with the patient. I affirm the documentation and agree with the treatment and plan.  The attached note describes my personal findings.      Documentation assistance provided by long Tello for Dr. Perry. Information recorded by the scribnelson was done at my direction and has been verified and validated by me.             Scott Tello  08/20/20 1400       Eric Perry MD  08/20/20 0272

## 2020-08-23 ENCOUNTER — HOSPITAL ENCOUNTER (OUTPATIENT)
Dept: INFUSION THERAPY | Facility: HOSPITAL | Age: 55
Discharge: HOME | End: 2020-08-23
Admitting: PHYSICIAN ASSISTANT

## 2020-08-23 VITALS
DIASTOLIC BLOOD PRESSURE: 87 MMHG | TEMPERATURE: 98 F | RESPIRATION RATE: 16 BRPM | OXYGEN SATURATION: 96 % | SYSTOLIC BLOOD PRESSURE: 125 MMHG | HEART RATE: 93 BPM

## 2020-08-23 DIAGNOSIS — Z23 NEED FOR VACCINATION: Primary | ICD-10-CM

## 2020-08-23 PROCEDURE — 90471 IMMUNIZATION ADMIN: CPT

## 2020-08-23 PROCEDURE — 96372 THER/PROPH/DIAG INJ SC/IM: CPT

## 2020-08-23 PROCEDURE — 90675 RABIES VACCINE IM: CPT | Performed by: PHYSICIAN ASSISTANT

## 2020-08-23 PROCEDURE — 25010000002 RABIES VACCINE PER 1 ML: Performed by: PHYSICIAN ASSISTANT

## 2020-08-23 RX ADMIN — RABIES VACCINE 2.5 UNITS: KIT at 09:10

## 2020-08-23 NOTE — PROGRESS NOTES
Patient tolerated rabies vaccine injection without issues. Aware to return to [facility] on 8/27/2020 for next injection with understanding voiced. Ambulated out to car per self.

## 2020-08-27 ENCOUNTER — HOSPITAL ENCOUNTER (OUTPATIENT)
Dept: INFUSION THERAPY | Facility: HOSPITAL | Age: 55
Discharge: HOME | End: 2020-08-27
Admitting: PHYSICIAN ASSISTANT

## 2020-08-27 VITALS
RESPIRATION RATE: 20 BRPM | TEMPERATURE: 98 F | SYSTOLIC BLOOD PRESSURE: 137 MMHG | HEART RATE: 94 BPM | OXYGEN SATURATION: 96 % | DIASTOLIC BLOOD PRESSURE: 97 MMHG

## 2020-08-27 DIAGNOSIS — Z23 NEED FOR VACCINATION: Primary | ICD-10-CM

## 2020-08-27 PROCEDURE — 90675 RABIES VACCINE IM: CPT | Performed by: PHYSICIAN ASSISTANT

## 2020-08-27 PROCEDURE — 90471 IMMUNIZATION ADMIN: CPT

## 2020-08-27 PROCEDURE — 25010000002 RABIES VACCINE PER 1 ML: Performed by: PHYSICIAN ASSISTANT

## 2020-08-27 PROCEDURE — 96372 THER/PROPH/DIAG INJ SC/IM: CPT

## 2020-08-27 RX ADMIN — RABIES VACCINE 2.5 UNITS: KIT at 11:50

## 2020-08-27 NOTE — PROGRESS NOTES
Pt tolerated rabies vaccine injection without issues.  Injection site x 1 with band aide applied.  AVS given & pt DC per ambulation after completion of visit.

## 2020-09-03 ENCOUNTER — HOSPITAL ENCOUNTER (OUTPATIENT)
Dept: INFUSION THERAPY | Facility: HOSPITAL | Age: 55
Discharge: HOME | End: 2020-09-03
Admitting: PHYSICIAN ASSISTANT

## 2020-09-03 VITALS
OXYGEN SATURATION: 97 % | SYSTOLIC BLOOD PRESSURE: 132 MMHG | RESPIRATION RATE: 16 BRPM | DIASTOLIC BLOOD PRESSURE: 89 MMHG | HEART RATE: 98 BPM | TEMPERATURE: 97.8 F

## 2020-09-03 DIAGNOSIS — Z23 NEED FOR VACCINATION: Primary | ICD-10-CM

## 2020-09-03 PROCEDURE — 90471 IMMUNIZATION ADMIN: CPT

## 2020-09-03 PROCEDURE — 90675 RABIES VACCINE IM: CPT | Performed by: PHYSICIAN ASSISTANT

## 2020-09-03 PROCEDURE — 25010000002 RABIES VACCINE PER 1 ML: Performed by: PHYSICIAN ASSISTANT

## 2020-09-03 PROCEDURE — 96372 THER/PROPH/DIAG INJ SC/IM: CPT

## 2020-09-03 RX ADMIN — Medication 2.5 UNITS: at 11:45

## 2020-09-03 NOTE — PROGRESS NOTES
Pt tolerated injection well and states she has had no issues with previous injection.  Pt dc'ed ambulatory.

## 2020-10-05 ENCOUNTER — FLU SHOT (OUTPATIENT)
Dept: FAMILY MEDICINE CLINIC | Facility: CLINIC | Age: 55
End: 2020-10-05

## 2020-10-05 DIAGNOSIS — Z23 NEED FOR INFLUENZA VACCINATION: ICD-10-CM

## 2020-10-05 PROCEDURE — 90686 IIV4 VACC NO PRSV 0.5 ML IM: CPT | Performed by: INTERNAL MEDICINE

## 2020-10-05 PROCEDURE — 90471 IMMUNIZATION ADMIN: CPT | Performed by: INTERNAL MEDICINE

## 2020-10-29 ENCOUNTER — TRANSCRIBE ORDERS (OUTPATIENT)
Dept: ADMINISTRATIVE | Facility: HOSPITAL | Age: 55
End: 2020-10-29

## 2020-10-29 DIAGNOSIS — Z12.31 VISIT FOR SCREENING MAMMOGRAM: Primary | ICD-10-CM

## 2020-11-08 RX ORDER — ATORVASTATIN CALCIUM 40 MG/1
TABLET, FILM COATED ORAL
Qty: 30 TABLET | Refills: 6 | Status: SHIPPED | OUTPATIENT
Start: 2020-11-08 | End: 2021-07-07

## 2020-12-03 ENCOUNTER — OFFICE VISIT (OUTPATIENT)
Dept: FAMILY MEDICINE CLINIC | Facility: CLINIC | Age: 55
End: 2020-12-03

## 2020-12-03 VITALS
BODY MASS INDEX: 45.32 KG/M2 | TEMPERATURE: 96.9 F | SYSTOLIC BLOOD PRESSURE: 126 MMHG | WEIGHT: 282 LBS | DIASTOLIC BLOOD PRESSURE: 84 MMHG | HEART RATE: 86 BPM | OXYGEN SATURATION: 97 % | HEIGHT: 66 IN

## 2020-12-03 DIAGNOSIS — E78.5 HYPERLIPIDEMIA, UNSPECIFIED HYPERLIPIDEMIA TYPE: ICD-10-CM

## 2020-12-03 DIAGNOSIS — F33.41 RECURRENT MAJOR DEPRESSIVE DISORDER, IN PARTIAL REMISSION (HCC): ICD-10-CM

## 2020-12-03 DIAGNOSIS — Z12.31 ENCOUNTER FOR SCREENING MAMMOGRAM FOR MALIGNANT NEOPLASM OF BREAST: Primary | ICD-10-CM

## 2020-12-03 DIAGNOSIS — K21.9 GASTROESOPHAGEAL REFLUX DISEASE WITHOUT ESOPHAGITIS: ICD-10-CM

## 2020-12-03 DIAGNOSIS — E66.01 MORBID OBESITY WITH BMI OF 40.0-44.9, ADULT (HCC): ICD-10-CM

## 2020-12-03 PROCEDURE — 99213 OFFICE O/P EST LOW 20 MIN: CPT | Performed by: INTERNAL MEDICINE

## 2020-12-03 NOTE — PROGRESS NOTES
MACARENA@  Cayla Cole is a 54 y.o. female.     Chief Complaint   Patient presents with   • Hyperlipidemia   • GI Problem   • Headache   • Depression       History of Present Illness   Patient follow-up for hyperlipidemia, morbid obesity, headaches, depression.  She also has GERD.  Prilosec is helping her GERD symptoms.  She is on multiple medication for depression followed by psychiatrist.  Taking Lipitor without any problems.  She takes baclofen for the chronic headaches.  No nausea vomiting.  No further headaches.  She is trying to diet and exercise at home.  The following portions of the patient's history were reviewed and updated as appropriate: allergies, current medications, past family history, past medical history, past social history, past surgical history and problem list.    Review of Systems   Constitutional: Negative for activity change, appetite change, fatigue and fever.   Eyes: Negative for blurred vision and double vision.   Respiratory: Negative.  Negative for shortness of breath.    Cardiovascular: Negative for chest pain, palpitations and leg swelling.   Gastrointestinal: Negative.    Neurological: Negative for dizziness, syncope, light-headedness and headache.   Psychiatric/Behavioral: Negative for agitation and behavioral problems.       Allergies   Allergen Reactions   • Nsaids Anaphylaxis       Current Outpatient Medications on File Prior to Visit   Medication Sig Dispense Refill   • ARIPiprazole (ABILIFY) 10 MG tablet      • atorvastatin (LIPITOR) 40 MG tablet TAKE ONE TABLET BY MOUTH EVERY NIGHT AT BEDTIME 30 tablet 6   • baclofen (LIORESAL) 10 MG tablet TAKE ONE TABLET BY MOUTH TWICE A DAY AS NEEDED FOR MUSCLE SPASMS 30 tablet 2   • buPROPion XL (WELLBUTRIN XL) 150 MG 24 hr tablet      • buPROPion XL (WELLBUTRIN XL) 300 MG 24 hr tablet      • cetirizine (zyrTEC) 10 MG tablet Take 10 mg by mouth Daily.     • clonazePAM (KlonoPIN) 0.5 MG tablet      • lamoTRIgine (LaMICtal) 100 MG  tablet      • omeprazole (priLOSEC) 40 MG capsule      • venlafaxine XR (EFFEXOR-XR) 150 MG 24 hr capsule      • amitriptyline (ELAVIL) 50 MG tablet        No current facility-administered medications on file prior to visit.        Family History   Problem Relation Age of Onset   • Rheum arthritis Mother    • Kidney disease Mother    • Hypertension Father    • Dementia Father    • Heart murmur Sister    • Post-traumatic stress disorder Brother    • No Known Problems Son    • Cervical cancer Maternal Grandmother    • Aneurysm Maternal Grandmother    • No Known Problems Son        Past Medical History:   Diagnosis Date   • Allergic    • Anxiety    • Arthritis    • Depression    • Diverticulosis    • GERD (gastroesophageal reflux disease)    • Headache    • Kidney stone    • Low back pain    • Scoliosis        Past Surgical History:   Procedure Laterality Date   •  SECTION     • KNEE MENISCAL REPAIR Left    • LASER ABLATION OF THE CERVIX     • WISDOM TOOTH EXTRACTION         Social History     Socioeconomic History   • Marital status:      Spouse name: Not on file   • Number of children: Not on file   • Years of education: Not on file   • Highest education level: Not on file   Tobacco Use   • Smoking status: Never Smoker   • Smokeless tobacco: Never Used   Substance and Sexual Activity   • Alcohol use: Yes     Comment: OCCASION   • Drug use: No   • Sexual activity: Not Currently       Patient Active Problem List   Diagnosis   • Anxiety   • Lumbar radiculopathy   • Morbid obesity with BMI of 40.0-44.9, adult (CMS/HCC)   • Synovial cyst of lumbar facet joint   • Hyperlipidemia   • Gastroesophageal reflux disease without esophagitis   • Tension-type headache, not intractable   • Recurrent major depressive disorder, in partial remission (CMS/HCC)   • Esophageal stricture   • Need for vaccination       /84 (BP Location: Right arm, Patient Position: Sitting, Cuff Size: Adult)   Pulse 86   Temp 96.9 °F  "(36.1 °C) (Temporal)   Ht 167.6 cm (66\")   Wt 128 kg (282 lb)   SpO2 97%   BMI 45.52 kg/m²   Body mass index is 45.52 kg/m².    Objective   Physical Exam  Constitutional:       Appearance: She is well-developed.   Eyes:      Pupils: Pupils are equal, round, and reactive to light.   Neck:      Musculoskeletal: Normal range of motion and neck supple.      Thyroid: No thyromegaly.      Vascular: No JVD.      Trachea: No tracheal deviation.   Cardiovascular:      Rate and Rhythm: Normal rate and regular rhythm.      Heart sounds: No murmur.   Pulmonary:      Effort: Pulmonary effort is normal. No respiratory distress.      Breath sounds: Normal breath sounds. No wheezing.   Abdominal:      General: Bowel sounds are normal. There is no distension.      Palpations: Abdomen is soft. There is no mass.      Tenderness: There is no abdominal tenderness. There is no guarding or rebound.      Hernia: No hernia is present.   Lymphadenopathy:      Cervical: No cervical adenopathy.   Neurological:      General: No focal deficit present.      Mental Status: She is alert and oriented to person, place, and time.           Assessment/Plan   Diagnoses and all orders for this visit:    1. Encounter for screening mammogram for malignant neoplasm of breast (Primary)  -     Comprehensive Metabolic Panel  -     CK  -     Lipid Panel With / Chol / HDL Ratio    2. Hyperlipidemia, unspecified hyperlipidemia type  -     Comprehensive Metabolic Panel  -     CK  -     Lipid Panel With / Chol / HDL Ratio    3. Morbid obesity with BMI of 40.0-44.9, adult (CMS/[facility])  -     Comprehensive Metabolic Panel  -     CK  -     Lipid Panel With / Chol / HDL Ratio    4. Gastroesophageal reflux disease without esophagitis  -     Comprehensive Metabolic Panel  -     CK  -     Lipid Panel With / Chol / HDL Ratio    5. Recurrent major depressive disorder, in partial remission (CMS/[facility])  -     Comprehensive Metabolic Panel  -     CK  -     Lipid Panel With / " Chol / HDL Ratio    Continue diet and exercise.  Lose weight continue taking all current medication.  Check blood pressure at home.  Return in 3 months time.  Discussed with patient getting calcium scoring cardiac scan.  Recommend coming for physical.  Keep appointment for mammogram and Pap smear.  All problems are chronic and stable.  Return in 3 months time.

## 2020-12-04 LAB
ALBUMIN SERPL-MCNC: 4.2 G/DL (ref 3.5–5.2)
ALBUMIN/GLOB SERPL: 2 G/DL
ALP SERPL-CCNC: 164 U/L (ref 39–117)
ALT SERPL-CCNC: 30 U/L (ref 1–33)
AST SERPL-CCNC: 18 U/L (ref 1–32)
BILIRUB SERPL-MCNC: 0.3 MG/DL (ref 0–1.2)
BUN SERPL-MCNC: 11 MG/DL (ref 6–20)
BUN/CREAT SERPL: 11.8 (ref 7–25)
CALCIUM SERPL-MCNC: 9 MG/DL (ref 8.6–10.5)
CHLORIDE SERPL-SCNC: 100 MMOL/L (ref 98–107)
CHOLEST SERPL-MCNC: 180 MG/DL (ref 0–200)
CHOLEST/HDLC SERPL: 2.12 {RATIO}
CK SERPL-CCNC: 93 U/L (ref 20–180)
CO2 SERPL-SCNC: 28.3 MMOL/L (ref 22–29)
CREAT SERPL-MCNC: 0.93 MG/DL (ref 0.57–1)
GLOBULIN SER CALC-MCNC: 2.1 GM/DL
GLUCOSE SERPL-MCNC: 96 MG/DL (ref 65–99)
HDLC SERPL-MCNC: 85 MG/DL (ref 40–60)
LDLC SERPL CALC-MCNC: 80 MG/DL (ref 0–100)
POTASSIUM SERPL-SCNC: 4.1 MMOL/L (ref 3.5–5.2)
PROT SERPL-MCNC: 6.3 G/DL (ref 6–8.5)
SODIUM SERPL-SCNC: 136 MMOL/L (ref 136–145)
TRIGL SERPL-MCNC: 84 MG/DL (ref 0–150)
VLDLC SERPL CALC-MCNC: 15 MG/DL (ref 5–40)

## 2021-01-06 ENCOUNTER — HOSPITAL ENCOUNTER (OUTPATIENT)
Dept: MAMMOGRAPHY | Facility: HOSPITAL | Age: 56
Discharge: HOME | End: 2021-01-06
Admitting: INTERNAL MEDICINE

## 2021-01-06 DIAGNOSIS — Z12.31 VISIT FOR SCREENING MAMMOGRAM: ICD-10-CM

## 2021-01-06 PROCEDURE — 77063 BREAST TOMOSYNTHESIS BI: CPT

## 2021-01-06 PROCEDURE — 77067 SCR MAMMO BI INCL CAD: CPT

## 2021-02-11 ENCOUNTER — OFFICE VISIT (OUTPATIENT)
Dept: FAMILY MEDICINE CLINIC | Facility: CLINIC | Age: 56
End: 2021-02-11

## 2021-02-11 DIAGNOSIS — F33.41 RECURRENT MAJOR DEPRESSIVE DISORDER, IN PARTIAL REMISSION (HCC): ICD-10-CM

## 2021-02-11 DIAGNOSIS — K21.9 GASTROESOPHAGEAL REFLUX DISEASE WITHOUT ESOPHAGITIS: ICD-10-CM

## 2021-02-11 DIAGNOSIS — E78.5 HYPERLIPIDEMIA, UNSPECIFIED HYPERLIPIDEMIA TYPE: ICD-10-CM

## 2021-02-11 DIAGNOSIS — F41.9 ANXIETY: ICD-10-CM

## 2021-02-11 DIAGNOSIS — E66.01 MORBID OBESITY WITH BMI OF 40.0-44.9, ADULT (HCC): ICD-10-CM

## 2021-02-11 DIAGNOSIS — G44.229 CHRONIC TENSION-TYPE HEADACHE, NOT INTRACTABLE: ICD-10-CM

## 2021-02-11 DIAGNOSIS — Z00.00 ANNUAL PHYSICAL EXAM: Primary | ICD-10-CM

## 2021-02-11 PROCEDURE — 99396 PREV VISIT EST AGE 40-64: CPT | Performed by: INTERNAL MEDICINE

## 2021-02-25 ENCOUNTER — LAB (OUTPATIENT)
Dept: FAMILY MEDICINE CLINIC | Facility: CLINIC | Age: 56
End: 2021-02-25

## 2021-02-25 DIAGNOSIS — K21.9 GASTROESOPHAGEAL REFLUX DISEASE WITHOUT ESOPHAGITIS: ICD-10-CM

## 2021-02-25 DIAGNOSIS — Z00.00 ANNUAL PHYSICAL EXAM: ICD-10-CM

## 2021-02-25 DIAGNOSIS — E78.00 PURE HYPERCHOLESTEROLEMIA: Primary | ICD-10-CM

## 2021-02-25 DIAGNOSIS — E78.00 PURE HYPERCHOLESTEROLEMIA: ICD-10-CM

## 2021-02-26 LAB
ALBUMIN SERPL-MCNC: 3.8 G/DL (ref 3.5–5.2)
ALBUMIN/GLOB SERPL: 1.9 G/DL
ALP SERPL-CCNC: 121 U/L (ref 39–117)
ALT SERPL-CCNC: 16 U/L (ref 1–33)
AST SERPL-CCNC: 17 U/L (ref 1–32)
BASOPHILS # BLD AUTO: 0.05 10*3/MM3 (ref 0–0.2)
BASOPHILS NFR BLD AUTO: 1.3 % (ref 0–1.5)
BILIRUB SERPL-MCNC: 0.3 MG/DL (ref 0–1.2)
BUN SERPL-MCNC: 11 MG/DL (ref 6–20)
BUN/CREAT SERPL: 13.3 (ref 7–25)
CALCIUM SERPL-MCNC: 8.9 MG/DL (ref 8.6–10.5)
CHLORIDE SERPL-SCNC: 102 MMOL/L (ref 98–107)
CHOLEST SERPL-MCNC: 212 MG/DL (ref 0–200)
CK SERPL-CCNC: 77 U/L (ref 20–180)
CO2 SERPL-SCNC: 27.6 MMOL/L (ref 22–29)
CREAT SERPL-MCNC: 0.83 MG/DL (ref 0.57–1)
EOSINOPHIL # BLD AUTO: 0.06 10*3/MM3 (ref 0–0.4)
EOSINOPHIL NFR BLD AUTO: 1.6 % (ref 0.3–6.2)
ERYTHROCYTE [DISTWIDTH] IN BLOOD BY AUTOMATED COUNT: 13.9 % (ref 12.3–15.4)
GLOBULIN SER CALC-MCNC: 2 GM/DL
GLUCOSE SERPL-MCNC: 86 MG/DL (ref 65–99)
HCT VFR BLD AUTO: 41.6 % (ref 34–46.6)
HDLC SERPL-MCNC: 59 MG/DL (ref 40–60)
HGB BLD-MCNC: 13.7 G/DL (ref 12–15.9)
IMM GRANULOCYTES # BLD AUTO: 0 10*3/MM3 (ref 0–0.05)
IMM GRANULOCYTES NFR BLD AUTO: 0 % (ref 0–0.5)
LDLC SERPL CALC-MCNC: 136 MG/DL (ref 0–100)
LDLC/HDLC SERPL: 2.27 {RATIO}
LYMPHOCYTES # BLD AUTO: 1.65 10*3/MM3 (ref 0.7–3.1)
LYMPHOCYTES NFR BLD AUTO: 43.9 % (ref 19.6–45.3)
MCH RBC QN AUTO: 27.7 PG (ref 26.6–33)
MCHC RBC AUTO-ENTMCNC: 32.9 G/DL (ref 31.5–35.7)
MCV RBC AUTO: 84 FL (ref 79–97)
MONOCYTES # BLD AUTO: 0.3 10*3/MM3 (ref 0.1–0.9)
MONOCYTES NFR BLD AUTO: 8 % (ref 5–12)
NEUTROPHILS # BLD AUTO: 1.7 10*3/MM3 (ref 1.7–7)
NEUTROPHILS NFR BLD AUTO: 45.2 % (ref 42.7–76)
NRBC BLD AUTO-RTO: 0 /100 WBC (ref 0–0.2)
PLATELET # BLD AUTO: 318 10*3/MM3 (ref 140–450)
POTASSIUM SERPL-SCNC: 4.3 MMOL/L (ref 3.5–5.2)
PROT SERPL-MCNC: 5.8 G/DL (ref 6–8.5)
RBC # BLD AUTO: 4.95 10*6/MM3 (ref 3.77–5.28)
SODIUM SERPL-SCNC: 137 MMOL/L (ref 136–145)
TRIGL SERPL-MCNC: 96 MG/DL (ref 0–150)
TSH SERPL DL<=0.005 MIU/L-ACNC: 1.86 UIU/ML (ref 0.27–4.2)
VLDLC SERPL CALC-MCNC: 17 MG/DL (ref 5–40)
WBC # BLD AUTO: 3.76 10*3/MM3 (ref 3.4–10.8)

## 2021-03-11 VITALS
HEIGHT: 66 IN | DIASTOLIC BLOOD PRESSURE: 62 MMHG | WEIGHT: 124 LBS | OXYGEN SATURATION: 98 % | SYSTOLIC BLOOD PRESSURE: 108 MMHG | TEMPERATURE: 97.1 F | BODY MASS INDEX: 19.93 KG/M2 | HEART RATE: 96 BPM

## 2021-03-11 NOTE — PROGRESS NOTES
MACARENA@  Cayla Cole is a 55 y.o. female.     Chief Complaint   Patient presents with   • Hyperlipidemia   • GI Problem   • Anxiety   Annual physical    History of Present Illness   Patient here follow-up for annual physical.  She had a Pap done, calcium scoring heart CT was done showed a calcium score of 0.  No chest pain or shortness breath.  She has hyperlipidemia, GERD, chronic headache, anxiety and depression.  She follows with a psychiatrist.  Continues to take her Lipitor, Prilosec and baclofen.  There are several other medications given by psychiatry.  The following portions of the patient's history were reviewed and updated as appropriate: allergies, current medications, past family history, past medical history, past social history, past surgical history and problem list.    Review of Systems   Constitutional: Negative for activity change, appetite change, fatigue and fever.   HENT: Negative.    Eyes: Negative for blurred vision and double vision.   Respiratory: Negative.  Negative for shortness of breath.    Cardiovascular: Negative for chest pain, palpitations and leg swelling.   Gastrointestinal: Negative.    Endocrine: Negative.    Genitourinary: Negative for hematuria.   Musculoskeletal: Negative.    Neurological: Negative.  Negative for dizziness, syncope and light-headedness.   Hematological: Negative.    Psychiatric/Behavioral: Negative.        Allergies   Allergen Reactions   • Nsaids Anaphylaxis       Current Outpatient Medications on File Prior to Visit   Medication Sig Dispense Refill   • ARIPiprazole (ABILIFY) 5 MG tablet Take 5 mg by mouth Daily.     • atorvastatin (LIPITOR) 40 MG tablet TAKE ONE TABLET BY MOUTH EVERY NIGHT AT BEDTIME (Patient taking differently: 20 mg Every Night.) 30 tablet 6   • baclofen (LIORESAL) 10 MG tablet TAKE ONE TABLET BY MOUTH TWICE A DAY AS NEEDED FOR MUSCLE SPASMS 30 tablet 2   • buPROPion XL (WELLBUTRIN XL) 150 MG 24 hr tablet      • buPROPion XL  (WELLBUTRIN XL) 300 MG 24 hr tablet      • cetirizine (zyrTEC) 10 MG tablet Take 10 mg by mouth Daily.     • clonazePAM (KlonoPIN) 0.5 MG tablet      • lamoTRIgine (LaMICtal) 100 MG tablet      • omeprazole (priLOSEC) 40 MG capsule      • venlafaxine XR (EFFEXOR-XR) 150 MG 24 hr capsule      • amitriptyline (ELAVIL) 50 MG tablet      • ARIPiprazole (ABILIFY) 10 MG tablet        No current facility-administered medications on file prior to visit.        Family History   Problem Relation Age of Onset   • Rheum arthritis Mother    • Kidney disease Mother    • Hypertension Father    • Dementia Father    • Heart murmur Sister    • Post-traumatic stress disorder Brother    • No Known Problems Son    • Cervical cancer Maternal Grandmother    • Aneurysm Maternal Grandmother    • No Known Problems Son        Past Medical History:   Diagnosis Date   • Allergic    • Anxiety    • Arthritis    • Depression    • Diverticulosis    • GERD (gastroesophageal reflux disease)    • Headache    • Kidney stone    • Low back pain    • Scoliosis        Past Surgical History:   Procedure Laterality Date   •  SECTION     • KNEE MENISCAL REPAIR Left    • LASER ABLATION OF THE CERVIX     • WISDOM TOOTH EXTRACTION         Social History     Socioeconomic History   • Marital status:      Spouse name: Not on file   • Number of children: Not on file   • Years of education: Not on file   • Highest education level: Not on file   Tobacco Use   • Smoking status: Never Smoker   • Smokeless tobacco: Never Used   Substance and Sexual Activity   • Alcohol use: Yes     Comment: OCCASION   • Drug use: No   • Sexual activity: Not Currently       Patient Active Problem List   Diagnosis   • Anxiety   • Lumbar radiculopathy   • Morbid obesity with BMI of 40.0-44.9, adult (CMS/[facility])   • Synovial cyst of lumbar facet joint   • Hyperlipidemia   • Gastroesophageal reflux disease without esophagitis   • Tension-type headache, not intractable   •  "Recurrent major depressive disorder, in partial remission (CMS/HCC)   • Esophageal stricture   • Need for vaccination       /62 (BP Location: Right arm, Patient Position: Sitting, Cuff Size: Large Adult)   Pulse 96   Temp 97.1 °F (36.2 °C) (Temporal)   Ht 167.6 cm (66\")   Wt 124 kg (273 lb)   SpO2 98%   BMI 44.06 kg/m²   Body mass index is 44.06 kg/m².    Objective   Physical Exam  Constitutional:       General: She is not in acute distress.     Appearance: She is well-developed. She is not diaphoretic.   HENT:      Head: Normocephalic and atraumatic.      Right Ear: Tympanic membrane, ear canal and external ear normal.      Left Ear: Tympanic membrane, ear canal and external ear normal.      Nose: No congestion.      Mouth/Throat:      Mouth: Mucous membranes are moist.      Pharynx: Oropharynx is clear. No oropharyngeal exudate or posterior oropharyngeal erythema.   Eyes:      Extraocular Movements: Extraocular movements intact.      Conjunctiva/sclera: Conjunctivae normal.      Pupils: Pupils are equal, round, and reactive to light.   Neck:      Musculoskeletal: Normal range of motion and neck supple.      Thyroid: No thyromegaly.      Vascular: No JVD.      Trachea: No tracheal deviation.   Cardiovascular:      Rate and Rhythm: Normal rate and regular rhythm.      Heart sounds: Normal heart sounds. No murmur. No friction rub. No gallop.    Pulmonary:      Effort: Pulmonary effort is normal. No respiratory distress.      Breath sounds: Normal breath sounds. No stridor. No wheezing or rales.   Chest:      Chest wall: No tenderness.   Abdominal:      General: Bowel sounds are normal. There is no distension.      Palpations: Abdomen is soft. There is no mass.      Tenderness: There is no abdominal tenderness. There is no guarding or rebound.      Hernia: No hernia is present.   Musculoskeletal: Normal range of motion.         General: No tenderness or deformity.   Lymphadenopathy:      Cervical: No " cervical adenopathy.   Skin:     General: Skin is warm and dry.      Coloration: Skin is not pale.      Findings: No erythema or rash.   Neurological:      General: No focal deficit present.      Mental Status: She is alert and oriented to person, place, and time. Mental status is at baseline.      Cranial Nerves: No cranial nerve deficit.      Sensory: No sensory deficit.      Motor: No weakness.      Coordination: Coordination normal.      Gait: Gait normal.      Deep Tendon Reflexes: Reflexes normal.      Comments: Cranial nerves II through XII grossly intact, DTR 2+, motor 5/5   Psychiatric:         Behavior: Behavior normal.         Thought Content: Thought content normal.         Judgment: Judgment normal.           Assessment/Plan   Diagnoses and all orders for this visit:    1. Annual physical exam (Primary)  -     ECG 12 Lead  -     CBC & Differential  -     CK  -     Comprehensive Metabolic Panel  -     Lipid Panel With / Chol / HDL Ratio  -     TSH  -     POC Urinalysis Dipstick, Automated    2. Hyperlipidemia, unspecified hyperlipidemia type    3. Morbid obesity with BMI of 40.0-44.9, adult (CMS/[facility])    4. Gastroesophageal reflux disease without esophagitis    5. Anxiety    6. Recurrent major depressive disorder, in partial remission (CMS/HCC)    Discussed with patient continue diet and exercise.  Lose weight.  Continue Lipitor, Prilosec, baclofen.  Continue to follow with a psychiatrist.  Get flu shot yearly.  Patient is waiting for COVID-19 vaccine.  She is just had her Pap smear done.  She is up-to-date on the tetanus.  All her problems are chronic and stable.  Patient will see me back in 3 months time.

## 2021-03-24 ENCOUNTER — BULK ORDERING (OUTPATIENT)
Dept: CASE MANAGEMENT | Facility: OTHER | Age: 56
End: 2021-03-24

## 2021-03-24 DIAGNOSIS — Z23 IMMUNIZATION DUE: ICD-10-CM

## 2021-03-28 ENCOUNTER — IMMUNIZATION (OUTPATIENT)
Dept: VACCINE CLINIC | Facility: HOSPITAL | Age: 56
End: 2021-03-28

## 2021-03-28 DIAGNOSIS — Z23 IMMUNIZATION DUE: ICD-10-CM

## 2021-03-28 PROCEDURE — 91300 HC SARSCOV02 VAC 30MCG/0.3ML IM: CPT | Performed by: INTERNAL MEDICINE

## 2021-03-28 PROCEDURE — 0001A: CPT | Performed by: INTERNAL MEDICINE

## 2021-04-18 ENCOUNTER — IMMUNIZATION (OUTPATIENT)
Dept: VACCINE CLINIC | Facility: HOSPITAL | Age: 56
End: 2021-04-18

## 2021-04-18 PROCEDURE — 91300 HC SARSCOV02 VAC 30MCG/0.3ML IM: CPT | Performed by: INTERNAL MEDICINE

## 2021-04-18 PROCEDURE — 0002A: CPT | Performed by: INTERNAL MEDICINE

## 2021-07-07 RX ORDER — ATORVASTATIN CALCIUM 40 MG/1
TABLET, FILM COATED ORAL
Qty: 30 TABLET | Refills: 5 | Status: SHIPPED | OUTPATIENT
Start: 2021-07-07

## 2023-08-31 ENCOUNTER — TRANSCRIBE ORDERS (OUTPATIENT)
Dept: LAB | Facility: HOSPITAL | Age: 58
End: 2023-08-31
Payer: COMMERCIAL

## 2023-08-31 ENCOUNTER — LAB (OUTPATIENT)
Dept: LAB | Facility: HOSPITAL | Age: 58
End: 2023-08-31
Payer: COMMERCIAL

## 2023-08-31 ENCOUNTER — HOSPITAL ENCOUNTER (OUTPATIENT)
Dept: CARDIOLOGY | Facility: HOSPITAL | Age: 58
Discharge: HOME | End: 2023-08-31
Payer: COMMERCIAL

## 2023-08-31 DIAGNOSIS — Z00.00 ROUTINE ADULT HEALTH MAINTENANCE: ICD-10-CM

## 2023-08-31 DIAGNOSIS — Z00.00 ROUTINE ADULT HEALTH MAINTENANCE: Primary | ICD-10-CM

## 2023-08-31 LAB
ALBUMIN SERPL-MCNC: 4.4 G/DL (ref 3.5–5.2)
ALBUMIN/GLOB SERPL: 1.8 G/DL
ALP SERPL-CCNC: 106 U/L (ref 39–117)
ALT SERPL W P-5'-P-CCNC: 12 U/L (ref 1–33)
AMPHET+METHAMPHET UR QL: NEGATIVE
ANION GAP SERPL CALCULATED.3IONS-SCNC: 13 MMOL/L (ref 5–15)
AST SERPL-CCNC: 16 U/L (ref 1–32)
BARBITURATES UR QL SCN: NEGATIVE
BASOPHILS # BLD AUTO: 0.05 10*3/MM3 (ref 0–0.2)
BASOPHILS NFR BLD AUTO: 0.9 % (ref 0–1.5)
BENZODIAZ UR QL SCN: POSITIVE
BILIRUB CONJ SERPL-MCNC: <0.2 MG/DL (ref 0–0.3)
BILIRUB SERPL-MCNC: 0.2 MG/DL (ref 0–1.2)
BUN SERPL-MCNC: 10 MG/DL (ref 6–20)
BUN/CREAT SERPL: 10.3 (ref 7–25)
CALCIUM SPEC-SCNC: 9.9 MG/DL (ref 8.6–10.5)
CANNABINOIDS SERPL QL: NEGATIVE
CHLORIDE SERPL-SCNC: 104 MMOL/L (ref 98–107)
CHOLEST SERPL-MCNC: 261 MG/DL (ref 0–200)
CO2 SERPL-SCNC: 25 MMOL/L (ref 22–29)
COCAINE UR QL: NEGATIVE
CREAT SERPL-MCNC: 0.97 MG/DL (ref 0.57–1)
DEPRECATED RDW RBC AUTO: 38.7 FL (ref 37–54)
EGFRCR SERPLBLD CKD-EPI 2021: 68.3 ML/MIN/1.73
EOSINOPHIL # BLD AUTO: 0.03 10*3/MM3 (ref 0–0.4)
EOSINOPHIL NFR BLD AUTO: 0.6 % (ref 0.3–6.2)
ERYTHROCYTE [DISTWIDTH] IN BLOOD BY AUTOMATED COUNT: 12.6 % (ref 12.3–15.4)
FENTANYL UR-MCNC: NEGATIVE NG/ML
GLOBULIN UR ELPH-MCNC: 2.4 GM/DL
GLUCOSE SERPL-MCNC: 95 MG/DL (ref 65–99)
HBA1C MFR BLD: 5.8 % (ref 4.8–5.6)
HCT VFR BLD AUTO: 42.7 % (ref 34–46.6)
HDLC SERPL-MCNC: 77 MG/DL (ref 40–60)
HGB BLD-MCNC: 14.4 G/DL (ref 12–15.9)
IMM GRANULOCYTES # BLD AUTO: 0.01 10*3/MM3 (ref 0–0.05)
IMM GRANULOCYTES NFR BLD AUTO: 0.2 % (ref 0–0.5)
LDLC SERPL CALC-MCNC: 173 MG/DL (ref 0–100)
LDLC/HDLC SERPL: 2.21 {RATIO}
LYMPHOCYTES # BLD AUTO: 1.97 10*3/MM3 (ref 0.7–3.1)
LYMPHOCYTES NFR BLD AUTO: 36.5 % (ref 19.6–45.3)
MCH RBC QN AUTO: 28.8 PG (ref 26.6–33)
MCHC RBC AUTO-ENTMCNC: 33.7 G/DL (ref 31.5–35.7)
MCV RBC AUTO: 85.4 FL (ref 79–97)
METHADONE UR QL SCN: NEGATIVE
MONOCYTES # BLD AUTO: 0.37 10*3/MM3 (ref 0.1–0.9)
MONOCYTES NFR BLD AUTO: 6.9 % (ref 5–12)
NEUTROPHILS NFR BLD AUTO: 2.97 10*3/MM3 (ref 1.7–7)
NEUTROPHILS NFR BLD AUTO: 54.9 % (ref 42.7–76)
NRBC BLD AUTO-RTO: 0 /100 WBC (ref 0–0.2)
OPIATES UR QL: NEGATIVE
OXYCODONE UR QL SCN: NEGATIVE
PLATELET # BLD AUTO: 294 10*3/MM3 (ref 140–450)
PMV BLD AUTO: 8.6 FL (ref 6–12)
POTASSIUM SERPL-SCNC: 3.9 MMOL/L (ref 3.5–5.2)
PROT SERPL-MCNC: 6.8 G/DL (ref 6–8.5)
QT INTERVAL: 347 MS
QTC INTERVAL: 383 MS
RBC # BLD AUTO: 5 10*6/MM3 (ref 3.77–5.28)
SODIUM SERPL-SCNC: 142 MMOL/L (ref 136–145)
T-UPTAKE NFR SERPL: 1.1 TBI (ref 0.8–1.3)
T4 SERPL-MCNC: 7.97 MCG/DL (ref 4.5–11.7)
TRIGL SERPL-MCNC: 69 MG/DL (ref 0–150)
TSH SERPL DL<=0.05 MIU/L-ACNC: 1.27 UIU/ML (ref 0.27–4.2)
VLDLC SERPL-MCNC: 11 MG/DL (ref 5–40)
WBC NRBC COR # BLD: 5.4 10*3/MM3 (ref 3.4–10.8)

## 2023-08-31 PROCEDURE — 83036 HEMOGLOBIN GLYCOSYLATED A1C: CPT

## 2023-08-31 PROCEDURE — 80307 DRUG TEST PRSMV CHEM ANLYZR: CPT

## 2023-08-31 PROCEDURE — 84436 ASSAY OF TOTAL THYROXINE: CPT

## 2023-08-31 PROCEDURE — 84479 ASSAY OF THYROID (T3 OR T4): CPT

## 2023-08-31 PROCEDURE — 80050 GENERAL HEALTH PANEL: CPT

## 2023-08-31 PROCEDURE — 82248 BILIRUBIN DIRECT: CPT

## 2023-08-31 PROCEDURE — 93005 ELECTROCARDIOGRAM TRACING: CPT | Performed by: PSYCHIATRIC HOSPITAL

## 2023-08-31 PROCEDURE — 80061 LIPID PANEL: CPT

## 2023-08-31 PROCEDURE — 36415 COLL VENOUS BLD VENIPUNCTURE: CPT

## 2023-09-05 ENCOUNTER — TELEPHONE (OUTPATIENT)
Dept: PEDIATRICS | Facility: OTHER | Age: 58
End: 2023-09-05
Payer: COMMERCIAL

## 2023-09-05 NOTE — TELEPHONE ENCOUNTER
Caller: Cayla Cole    Relationship to patient: Self    Best call back number: [phone]    Patient is needing: PATIENT CALLED [facility] TO ASK ABOUT SCHEDULING AS A NEW PATIENT WITH A PROVIDER    HUB ASKED IF SHE WAS FORMERLY A PATIENT OF DR. HAND, PATIENT SAID YES AND SHE WOULD LIKE TO CONTINUE SEEING HIM BUT WAS TOLD HE IS NOT ACCEPTING NEW PATIENTS    HUB DID SEE WHERE SHE HAD SEEN DR. HAND IN 2021 AND ADVISED NORMALLY IT HAS TO BE THREE YEARS OR MORE BETWEEN VISITS BEFORE A PATIENT IS NO LONGER CONSIDERED TO BE ACTIVE ACCORDING TO [facility] CHECKING TO SEE IF SHE WOULD STILL BE ABLE TO BE SEEN BY DR. HAND, PLEASE CONTACT PATIENT TO LET HER KNOW

## 2023-09-19 ENCOUNTER — OFFICE VISIT (OUTPATIENT)
Dept: FAMILY MEDICINE CLINIC | Facility: CLINIC | Age: 58
End: 2023-09-19
Payer: COMMERCIAL

## 2023-09-19 VITALS
RESPIRATION RATE: 18 BRPM | TEMPERATURE: 98.6 F | HEART RATE: 67 BPM | OXYGEN SATURATION: 98 % | WEIGHT: 230 LBS | SYSTOLIC BLOOD PRESSURE: 134 MMHG | HEIGHT: 66 IN | BODY MASS INDEX: 36.96 KG/M2 | DIASTOLIC BLOOD PRESSURE: 78 MMHG

## 2023-09-19 DIAGNOSIS — R01.1 MURMUR, CARDIAC: ICD-10-CM

## 2023-09-19 DIAGNOSIS — G44.229 CHRONIC TENSION-TYPE HEADACHE, NOT INTRACTABLE: ICD-10-CM

## 2023-09-19 DIAGNOSIS — K21.9 GASTROESOPHAGEAL REFLUX DISEASE WITHOUT ESOPHAGITIS: ICD-10-CM

## 2023-09-19 DIAGNOSIS — F33.41 RECURRENT MAJOR DEPRESSIVE DISORDER, IN PARTIAL REMISSION: ICD-10-CM

## 2023-09-19 DIAGNOSIS — E66.01 MORBID OBESITY WITH BMI OF 40.0-44.9, ADULT: ICD-10-CM

## 2023-09-19 DIAGNOSIS — E78.00 PURE HYPERCHOLESTEROLEMIA: Primary | ICD-10-CM

## 2023-09-19 DIAGNOSIS — R94.31 ABNORMAL EKG: ICD-10-CM

## 2023-09-19 PROCEDURE — 99214 OFFICE O/P EST MOD 30 MIN: CPT | Performed by: INTERNAL MEDICINE

## 2023-09-19 RX ORDER — FEXOFENADINE HCL 180 MG/1
180 TABLET ORAL DAILY
COMMUNITY
Start: 2022-06-01

## 2023-09-19 RX ORDER — OXCARBAZEPINE 600 MG/1
600 TABLET, FILM COATED ORAL DAILY
COMMUNITY
Start: 2022-06-01

## 2023-09-19 RX ORDER — ALPRAZOLAM 0.5 MG/1
0.5 TABLET ORAL NIGHTLY PRN
COMMUNITY
Start: 2022-06-01

## 2023-09-19 RX ORDER — SPIRONOLACTONE 25 MG/1
25 TABLET ORAL DAILY
COMMUNITY
Start: 2023-07-10

## 2023-09-19 RX ORDER — ATORVASTATIN CALCIUM 20 MG/1
20 TABLET, FILM COATED ORAL DAILY
COMMUNITY
Start: 2022-06-01

## 2023-09-19 RX ORDER — FLUTICASONE PROPIONATE 50 MCG
SPRAY, SUSPENSION (ML) NASAL
COMMUNITY
Start: 2022-06-01

## 2023-09-19 RX ORDER — MONTELUKAST SODIUM 10 MG/1
10 TABLET ORAL NIGHTLY
COMMUNITY
Start: 2022-06-01

## 2023-09-19 NOTE — PROGRESS NOTES
Subjective   Cayla Cole is a 57 y.o. female.     Chief Complaint   Patient presents with    Hyperlipidemia    Anxiety    Depression       Hyperlipidemia  Pertinent negatives include no chest pain or shortness of breath.   Anxiety  Patient reports no chest pain, depressed mood, dizziness, palpitations or shortness of breath.     Her past medical history is significant for depression.   DepressionPatient is not experiencing: depressed mood, palpitations and shortness of breath.       Patient has not been seen for long time.  She is coming back to see for hyperlipidemia, chronic migraine headaches, GERD, abnormal EKG done by psychiatrist.  Shows repolarization.  Slightly changed from the EKG from 2016.  Patient has no chest pain, shortness of breath.  Patient moved back in town about a month ago.  The following portions of the patient's history were reviewed and updated as appropriate: allergies, current medications, past family history, past medical history, past social history, past surgical history and problem list.    Review of Systems   Constitutional:  Negative for activity change, appetite change, fatigue and fever.   Eyes:  Negative for blurred vision and double vision.   Respiratory: Negative.  Negative for shortness of breath.    Cardiovascular:  Negative for chest pain, palpitations and leg swelling.   Gastrointestinal: Negative.    Neurological:  Negative for dizziness, syncope, light-headedness and headache.   Psychiatric/Behavioral:  Negative for depressed mood.      Allergies   Allergen Reactions    Nsaids Anaphylaxis       Current Outpatient Medications on File Prior to Visit   Medication Sig Dispense Refill    ALPRAZolam (XANAX) 0.5 MG tablet Take 1 tablet by mouth At Night As Needed.      atorvastatin (LIPITOR) 20 MG tablet Take 1 tablet by mouth Daily.      fexofenadine (ALLEGRA) 180 MG tablet Take 1 tablet by mouth Daily.      fluticasone (FLONASE) 50 MCG/ACT nasal spray       montelukast  (SINGULAIR) 10 MG tablet Take 1 tablet by mouth Every Night.      omeprazole (priLOSEC) 40 MG capsule       OXcarbazepine (TRILEPTAL) 600 MG tablet Take 1 tablet by mouth Daily.      spironolactone (ALDACTONE) 25 MG tablet Take 1 tablet by mouth Daily.      [DISCONTINUED] amitriptyline (ELAVIL) 50 MG tablet       [DISCONTINUED] ARIPiprazole (ABILIFY) 10 MG tablet       [DISCONTINUED] ARIPiprazole (ABILIFY) 5 MG tablet Take 5 mg by mouth Daily.      [DISCONTINUED] atorvastatin (LIPITOR) 40 MG tablet TAKE ONE TABLET BY MOUTH EVERY NIGHT AT BEDTIME 30 tablet 5    [DISCONTINUED] baclofen (LIORESAL) 10 MG tablet TAKE ONE TABLET BY MOUTH TWICE A DAY AS NEEDED FOR MUSCLE SPASMS 30 tablet 2    [DISCONTINUED] buPROPion XL (WELLBUTRIN XL) 150 MG 24 hr tablet       [DISCONTINUED] buPROPion XL (WELLBUTRIN XL) 300 MG 24 hr tablet       [DISCONTINUED] cetirizine (zyrTEC) 10 MG tablet Take 10 mg by mouth Daily.      [DISCONTINUED] clonazePAM (KlonoPIN) 0.5 MG tablet       [DISCONTINUED] lamoTRIgine (LaMICtal) 100 MG tablet       [DISCONTINUED] venlafaxine XR (EFFEXOR-XR) 150 MG 24 hr capsule        No current facility-administered medications on file prior to visit.       Family History   Problem Relation Age of Onset    Rheum arthritis Mother     Kidney disease Mother     Mental illness Mother     Hypertension Father     Dementia Father     Heart murmur Sister     Mental illness Sister     Post-traumatic stress disorder Brother     No Known Problems Son     Cervical cancer Maternal Grandmother     Aneurysm Maternal Grandmother     No Known Problems Son        Past Medical History:   Diagnosis Date    Allergic     Anxiety     Arthritis     Depression     Diverticulosis     GERD (gastroesophageal reflux disease)     Headache     Hypertension     Kidney stone     Low back pain     Scoliosis        Past Surgical History:   Procedure Laterality Date     SECTION      COLONOSCOPY  2016    KNEE MENISCAL REPAIR Left     LASER  "ABLATION OF THE CERVIX      WISDOM TOOTH EXTRACTION         Social History     Socioeconomic History    Marital status:    Tobacco Use    Smoking status: Never     Passive exposure: Never    Smokeless tobacco: Never   Vaping Use    Vaping Use: Never used   Substance and Sexual Activity    Alcohol use: Yes     Comment: OCCASION    Drug use: No    Sexual activity: Not Currently       Patient Active Problem List   Diagnosis    Anxiety    Lumbar radiculopathy    Morbid obesity with BMI of 40.0-44.9, adult    Synovial cyst of lumbar facet joint    Hyperlipidemia    Gastroesophageal reflux disease without esophagitis    Tension-type headache, not intractable    Recurrent major depressive disorder, in partial remission    Esophageal stricture    Need for vaccination    Murmur, cardiac       /78   Pulse 67   Temp 98.6 °F (37 °C)   Resp 18   Ht 167.6 cm (66\")   Wt 104 kg (230 lb)   SpO2 98%   BMI 37.12 kg/m²   Body mass index is 37.12 kg/m².    Objective   Physical Exam  Vitals and nursing note reviewed.   Constitutional:       Appearance: She is well-developed.   Eyes:      Extraocular Movements: Extraocular movements intact.      Pupils: Pupils are equal, round, and reactive to light.   Neck:      Thyroid: No thyromegaly.      Vascular: No JVD.      Trachea: No tracheal deviation.   Cardiovascular:      Rate and Rhythm: Normal rate and regular rhythm.      Heart sounds: No murmur heard.  Pulmonary:      Effort: Pulmonary effort is normal. No respiratory distress.      Breath sounds: Normal breath sounds. No wheezing.   Abdominal:      General: Bowel sounds are normal. There is no distension.      Palpations: Abdomen is soft. There is no mass.      Tenderness: There is no abdominal tenderness. There is no right CVA tenderness, left CVA tenderness, guarding or rebound.      Hernia: No hernia is present.   Musculoskeletal:      Cervical back: Normal range of motion and neck supple.   Lymphadenopathy:      " Cervical: No cervical adenopathy.   Neurological:      General: No focal deficit present.      Mental Status: She is alert and oriented to person, place, and time. Mental status is at baseline.   Psychiatric:         Mood and Affect: Mood normal.         Behavior: Behavior normal.         Assessment & Plan   Diagnoses and all orders for this visit:    1. Pure hypercholesterolemia (Primary)    2. Morbid obesity with BMI of 40.0-44.9, adult    3. Gastroesophageal reflux disease without esophagitis    4. Recurrent major depressive disorder, in partial remission    5. Chronic tension-type headache, not intractable    6. Abnormal EKG  -     Adult Transthoracic Echo Complete W/ Cont if Necessary Per Protocol; Future    7. Murmur, cardiac    Continue diet and exercise.  Lose weight..  Continue seeing psychiatrist.  Patient is on Xanax and Trileptal by psychiatrist.  Continue Prilosec helping her GERD symptoms.  She is on Lipitor, spironolactone.  Check 2D echo.  Systolic murmur is new.  Patient to return back in 2 months time will need fasting lab work done.  Reviewed labs that were done in August, at that time LDL was high per patient she has been off of her Lipitor.  All problems are chronic and stable except for the weight and depression.  EHR dragon/transcription disclaimer:  Part of this note are created by electronic transcription/translation of spoken language to printed text and thus may lead to erroneous, or at times, nonsensical words or phrases inadvertently transcribed.  Although I have reviewed for such errors, some may still exist.         Answers submitted by the patient for this visit:  Other (Submitted on 9/12/2023)  Please describe your symptoms.: Irregular EKG found during tests  Have you had these symptoms before?: No  How long have you been having these symptoms?: 1-4 days  Please list any medications you are currently taking for this condition.: None  Please describe any probable cause for these  symptoms. : Unknown  Primary Reason for Visit (Submitted on 9/12/2023)  What is the primary reason for your visit?: Other

## 2023-09-22 ENCOUNTER — TELEPHONE (OUTPATIENT)
Dept: FAMILY MEDICINE CLINIC | Facility: CLINIC | Age: 58
End: 2023-09-22
Payer: COMMERCIAL

## 2023-09-22 NOTE — TELEPHONE ENCOUNTER
Caller: Cayla Cole    Relationship to patient: Self    Best call back number: [phone]    PATIENT IS REQUESTING INFORMATION ON HER REFERRAL FOR AN ECHOCARDIOGRAM. PLEASE ADVISE.

## 2023-09-29 ENCOUNTER — HOSPITAL ENCOUNTER (OUTPATIENT)
Dept: CARDIOLOGY | Facility: HOSPITAL | Age: 58
Discharge: HOME | End: 2023-09-29
Payer: COMMERCIAL

## 2023-09-29 VITALS — HEIGHT: 66 IN | WEIGHT: 230 LBS | BODY MASS INDEX: 36.96 KG/M2

## 2023-09-29 DIAGNOSIS — R94.31 ABNORMAL EKG: ICD-10-CM

## 2023-09-29 LAB
AORTIC DIMENSIONLESS INDEX: 0.7 (DI)
ASCENDING AORTA: 3.6 CM
BH CV ECHO MEAS - ACS: 2.15 CM
BH CV ECHO MEAS - AO MAX PG: 7.9 MMHG
BH CV ECHO MEAS - AO MEAN PG: 4.2 MMHG
BH CV ECHO MEAS - AO ROOT DIAM: 3 CM
BH CV ECHO MEAS - AO V2 MAX: 140.5 CM/SEC
BH CV ECHO MEAS - AO V2 VTI: 26.7 CM
BH CV ECHO MEAS - AVA(I,D): 3.1 CM2
BH CV ECHO MEAS - EDV(CUBED): 75.7 ML
BH CV ECHO MEAS - EDV(MOD-SP2): 115 ML
BH CV ECHO MEAS - EDV(MOD-SP4): 136 ML
BH CV ECHO MEAS - EF(MOD-BP): 67.1 %
BH CV ECHO MEAS - EF(MOD-SP2): 68.7 %
BH CV ECHO MEAS - EF(MOD-SP4): 66.9 %
BH CV ECHO MEAS - ESV(CUBED): 25.3 ML
BH CV ECHO MEAS - ESV(MOD-SP2): 36 ML
BH CV ECHO MEAS - ESV(MOD-SP4): 45 ML
BH CV ECHO MEAS - FS: 30.6 %
BH CV ECHO MEAS - IVS/LVPW: 1.01 CM
BH CV ECHO MEAS - IVSD: 1.09 CM
BH CV ECHO MEAS - LAT PEAK E' VEL: 8.6 CM/SEC
BH CV ECHO MEAS - LV MASS(C)D: 155.8 GRAMS
BH CV ECHO MEAS - LV MAX PG: 4 MMHG
BH CV ECHO MEAS - LV MEAN PG: 2.07 MMHG
BH CV ECHO MEAS - LV V1 MAX: 100.3 CM/SEC
BH CV ECHO MEAS - LV V1 VTI: 19.8 CM
BH CV ECHO MEAS - LVIDD: 4.2 CM
BH CV ECHO MEAS - LVIDS: 2.9 CM
BH CV ECHO MEAS - LVOT AREA: 4.1 CM2
BH CV ECHO MEAS - LVOT DIAM: 2.3 CM
BH CV ECHO MEAS - LVPWD: 1.08 CM
BH CV ECHO MEAS - MED PEAK E' VEL: 6.2 CM/SEC
BH CV ECHO MEAS - MV A DUR: 0.14 SEC
BH CV ECHO MEAS - MV A MAX VEL: 70.3 CM/SEC
BH CV ECHO MEAS - MV DEC SLOPE: 244.5 CM/SEC2
BH CV ECHO MEAS - MV DEC TIME: 301 SEC
BH CV ECHO MEAS - MV E MAX VEL: 63.4 CM/SEC
BH CV ECHO MEAS - MV E/A: 0.9
BH CV ECHO MEAS - MV MAX PG: 1.94 MMHG
BH CV ECHO MEAS - MV MEAN PG: 0.92 MMHG
BH CV ECHO MEAS - MV P1/2T: 75.3 MSEC
BH CV ECHO MEAS - MV V2 VTI: 19.5 CM
BH CV ECHO MEAS - MVA(P1/2T): 2.9 CM2
BH CV ECHO MEAS - MVA(VTI): 4.2 CM2
BH CV ECHO MEAS - PA ACC TIME: 0.12 SEC
BH CV ECHO MEAS - PA V2 MAX: 100.4 CM/SEC
BH CV ECHO MEAS - PULM A REVS DUR: 0.18 SEC
BH CV ECHO MEAS - PULM A REVS VEL: 18.8 CM/SEC
BH CV ECHO MEAS - PULM DIAS VEL: 48.2 CM/SEC
BH CV ECHO MEAS - PULM S/D: 1.22
BH CV ECHO MEAS - PULM SYS VEL: 58.8 CM/SEC
BH CV ECHO MEAS - RAP SYSTOLE: 8 MMHG
BH CV ECHO MEAS - RV MAX PG: 2.25 MMHG
BH CV ECHO MEAS - RV V1 MAX: 75 CM/SEC
BH CV ECHO MEAS - RV V1 VTI: 14.3 CM
BH CV ECHO MEAS - RVSP: 32.3 MMHG
BH CV ECHO MEAS - SV(LVOT): 82.1 ML
BH CV ECHO MEAS - SV(MOD-SP2): 79 ML
BH CV ECHO MEAS - SV(MOD-SP4): 91 ML
BH CV ECHO MEAS - TAPSE (>1.6): 2.5 CM
BH CV ECHO MEAS - TR MAX PG: 24.3 MMHG
BH CV ECHO MEAS - TR MAX VEL: 246.5 CM/SEC
BH CV ECHO MEASUREMENTS AVERAGE E/E' RATIO: 8.57
BH CV VAS BP RIGHT ARM: NORMAL MMHG
BH CV XLRA - RV BASE: 3.1 CM
BH CV XLRA - RV LENGTH: 7.2 CM
BH CV XLRA - RV MID: 2.6 CM
BH CV XLRA - TDI S': 20 CM/SEC
LEFT ATRIUM VOLUME INDEX: 34.9 ML/M2
SINUS: 3.2 CM

## 2023-09-29 PROCEDURE — 93306 TTE W/DOPPLER COMPLETE: CPT

## 2023-10-12 ENCOUNTER — APPOINTMENT (OUTPATIENT)
Dept: WOMENS IMAGING | Facility: HOSPITAL | Age: 58
End: 2023-10-12
Payer: COMMERCIAL

## 2023-10-12 PROCEDURE — 77065 DX MAMMO INCL CAD UNI: CPT | Performed by: RADIOLOGY

## 2023-10-12 PROCEDURE — G0279 TOMOSYNTHESIS, MAMMO: HCPCS | Performed by: RADIOLOGY

## 2023-10-12 PROCEDURE — 77061 BREAST TOMOSYNTHESIS UNI: CPT | Performed by: RADIOLOGY

## 2023-11-29 ENCOUNTER — OFFICE VISIT (OUTPATIENT)
Dept: FAMILY MEDICINE CLINIC | Facility: CLINIC | Age: 58
End: 2023-11-29
Payer: COMMERCIAL

## 2023-11-29 VITALS
DIASTOLIC BLOOD PRESSURE: 122 MMHG | SYSTOLIC BLOOD PRESSURE: 182 MMHG | WEIGHT: 218 LBS | HEIGHT: 66 IN | RESPIRATION RATE: 18 BRPM | TEMPERATURE: 97.6 F | BODY MASS INDEX: 35.03 KG/M2 | HEART RATE: 82 BPM | OXYGEN SATURATION: 97 %

## 2023-11-29 DIAGNOSIS — G44.229 CHRONIC TENSION-TYPE HEADACHE, NOT INTRACTABLE: ICD-10-CM

## 2023-11-29 DIAGNOSIS — E66.01 MORBID OBESITY WITH BMI OF 40.0-44.9, ADULT: ICD-10-CM

## 2023-11-29 DIAGNOSIS — F33.41 RECURRENT MAJOR DEPRESSIVE DISORDER, IN PARTIAL REMISSION: ICD-10-CM

## 2023-11-29 DIAGNOSIS — K21.9 GASTROESOPHAGEAL REFLUX DISEASE WITHOUT ESOPHAGITIS: ICD-10-CM

## 2023-11-29 DIAGNOSIS — E78.00 PURE HYPERCHOLESTEROLEMIA: Primary | ICD-10-CM

## 2023-11-29 DIAGNOSIS — I10 PRIMARY HYPERTENSION: ICD-10-CM

## 2023-11-29 PROCEDURE — 99214 OFFICE O/P EST MOD 30 MIN: CPT | Performed by: INTERNAL MEDICINE

## 2023-11-29 RX ORDER — LAMOTRIGINE 150 MG/1
150 TABLET ORAL DAILY
COMMUNITY
Start: 2022-06-01

## 2023-11-29 RX ORDER — DESVENLAFAXINE 25 MG/1
25 TABLET, EXTENDED RELEASE ORAL DAILY
COMMUNITY
Start: 2023-11-22

## 2023-11-29 NOTE — PROGRESS NOTES
Subjective   Cayla Cole is a 57 y.o. female.     Chief Complaint   Patient presents with    Hyperlipidemia     Follow up    Hypertension, depression, GERD, morbid obesity    Hyperlipidemia  Pertinent negatives include no chest pain or shortness of breath.      Continue to follow for hypertension, depression, GERD, morbid obesity.  Blood pressure is high.  Per patient she is having one of the panic attacks.  She has been touch with her psychiatrist and psychologist.  Medications has been changed.  No chest pain, shortness of breath, dizziness, headache.  She take spironolactone for the blood pressure at home.  No HI or SI, no other complaints  The following portions of the patient's history were reviewed and updated as appropriate: allergies, current medications, past family history, past medical history, past social history, past surgical history and problem list.    Review of Systems   Constitutional:  Negative for activity change, appetite change, fatigue and fever.   Eyes: Negative.  Negative for blurred vision and double vision.   Respiratory: Negative.  Negative for shortness of breath.    Cardiovascular:  Negative for chest pain, palpitations and leg swelling.   Neurological:  Negative for dizziness, syncope, light-headedness and headache.   Psychiatric/Behavioral:  Positive for stress. Negative for self-injury, sleep disturbance and suicidal ideas. The patient is nervous/anxious.        Allergies   Allergen Reactions    Nsaids Anaphylaxis       Current Outpatient Medications on File Prior to Visit   Medication Sig Dispense Refill    ALPRAZolam (XANAX) 0.5 MG tablet Take 1 tablet by mouth At Night As Needed.      atorvastatin (LIPITOR) 20 MG tablet Take 1 tablet by mouth Daily.      Desvenlafaxine Succinate ER 25 MG tablet sustained-release 24 hour Take 1 tablet by mouth Daily.      fexofenadine (ALLEGRA) 180 MG tablet Take 1 tablet by mouth Daily.      fluticasone (FLONASE) 50 MCG/ACT nasal spray        lamoTRIgine (LaMICtal) 150 MG tablet Take 1 tablet by mouth Daily.      montelukast (SINGULAIR) 10 MG tablet Take 1 tablet by mouth Every Night.      omeprazole (priLOSEC) 40 MG capsule       OXcarbazepine (TRILEPTAL) 600 MG tablet Take 1 tablet by mouth Daily.      spironolactone (ALDACTONE) 25 MG tablet Take 1 tablet by mouth Daily.       No current facility-administered medications on file prior to visit.       Family History   Problem Relation Age of Onset    Rheum arthritis Mother     Kidney disease Mother     Mental illness Mother     Hypertension Father     Dementia Father     Heart murmur Sister     Mental illness Sister     Post-traumatic stress disorder Brother     No Known Problems Son     Cervical cancer Maternal Grandmother     Aneurysm Maternal Grandmother     No Known Problems Son        Past Medical History:   Diagnosis Date    Allergic     Anxiety     Arthritis     Depression     Diverticulosis     GERD (gastroesophageal reflux disease)     Headache     Hypertension     Kidney stone     Low back pain     Scoliosis     Visual impairment     Double vision probable cataract       Past Surgical History:   Procedure Laterality Date     SECTION      COLONOSCOPY  2016    KNEE MENISCAL REPAIR Left     LASER ABLATION OF THE CERVIX      WISDOM TOOTH EXTRACTION         Social History     Socioeconomic History    Marital status:    Tobacco Use    Smoking status: Never     Passive exposure: Never    Smokeless tobacco: Never   Vaping Use    Vaping Use: Never used   Substance and Sexual Activity    Alcohol use: Yes     Comment: OCCASION    Drug use: No    Sexual activity: Not Currently       Patient Active Problem List   Diagnosis    Anxiety    Lumbar radiculopathy    Morbid obesity with BMI of 40.0-44.9, adult    Synovial cyst of lumbar facet joint    Hyperlipidemia    Gastroesophageal reflux disease without esophagitis    Tension-type headache, not intractable    Recurrent major depressive  "disorder, in partial remission    Esophageal stricture    Need for vaccination    Murmur, cardiac    Primary hypertension       BP (!) 182/122 (BP Location: Left arm, Patient Position: Sitting, Cuff Size: Large Adult)   Pulse 82   Temp 97.6 °F (36.4 °C) (Oral)   Resp 18   Ht 167.6 cm (66\")   Wt 98.9 kg (218 lb)   SpO2 97%   BMI 35.19 kg/m²   Body mass index is 35.19 kg/m².    Objective   Physical Exam  Vitals and nursing note reviewed.   Constitutional:       Appearance: She is well-developed.   Eyes:      Pupils: Pupils are equal, round, and reactive to light.   Neck:      Thyroid: No thyromegaly.      Vascular: No JVD.      Trachea: No tracheal deviation.   Cardiovascular:      Rate and Rhythm: Normal rate and regular rhythm.      Heart sounds: Murmur heard.   Pulmonary:      Effort: Pulmonary effort is normal. No respiratory distress.      Breath sounds: Normal breath sounds. No wheezing.   Abdominal:      General: Bowel sounds are normal. There is no distension.      Palpations: Abdomen is soft. There is no mass.      Tenderness: There is no abdominal tenderness. There is no right CVA tenderness, left CVA tenderness, guarding or rebound.      Hernia: No hernia is present.   Musculoskeletal:      Cervical back: Normal range of motion and neck supple.   Lymphadenopathy:      Cervical: No cervical adenopathy.   Neurological:      General: No focal deficit present.      Mental Status: She is alert and oriented to person, place, and time.   Psychiatric:         Mood and Affect: Mood normal.         Behavior: Behavior normal.         Thought Content: Thought content normal.         Judgment: Judgment normal.           Assessment & Plan   Diagnoses and all orders for this visit:    1. Pure hypercholesterolemia (Primary)  -     Comprehensive Metabolic Panel  -     CK  -     Lipid Panel With / Chol / HDL Ratio  -     TSH    2. Gastroesophageal reflux disease without esophagitis  -     Comprehensive Metabolic " Panel  -     CK  -     Lipid Panel With / Chol / HDL Ratio  -     TSH    3. Chronic tension-type headache, not intractable  -     Comprehensive Metabolic Panel  -     CK  -     Lipid Panel With / Chol / HDL Ratio  -     TSH    4. Recurrent major depressive disorder, in partial remission    5. Primary hypertension    6. Morbid obesity with BMI of 40.0-44.9, adult    Continue diet and exercise.  Check labs.  Discussed with patient blood pressure very high needs to check at home.  Continue Lipitor, spironolactone, singular, Prilosec.  Prilosec helping her GERD symptoms.  She is also on Trileptal, Pristiq.  Patient check blood pressure at home if no better needs to come back.  Otherwise we will see her back in 3 months time.  If panic attacks gets worse encourage patient to go to the ER for medical care if needed.  EHR dragon/transcription disclaimer:  Part of this note are created by electronic transcription/translation of spoken language to printed text and thus may lead to erroneous, or at times, nonsensical words or phrases inadvertently transcribed.  Although I have reviewed for such errors, some may still exist.           Answers submitted by the patient for this visit:  Other (Submitted on 11/28/2023)  Please describe your symptoms.: Follow up prescription refills  Have you had these symptoms before?: Yes  How long have you been having these symptoms?: Greater than 2 weeks  Please list any medications you are currently taking for this condition.: Desvenlafaxine succnt er 25mg 1 a day  Please describe any probable cause for these symptoms. : Depression  Primary Reason for Visit (Submitted on 11/28/2023)  What is the primary reason for your visit?: Other

## 2023-11-30 LAB
ALBUMIN SERPL-MCNC: 4.5 G/DL (ref 3.5–5.2)
ALBUMIN/GLOB SERPL: 2.3 G/DL
ALP SERPL-CCNC: 117 U/L (ref 39–117)
ALT SERPL-CCNC: 20 U/L (ref 1–33)
AST SERPL-CCNC: 20 U/L (ref 1–32)
BILIRUB SERPL-MCNC: 0.3 MG/DL (ref 0–1.2)
BUN SERPL-MCNC: 6 MG/DL (ref 6–20)
BUN/CREAT SERPL: 7.3 (ref 7–25)
CALCIUM SERPL-MCNC: 10 MG/DL (ref 8.6–10.5)
CHLORIDE SERPL-SCNC: 106 MMOL/L (ref 98–107)
CHOLEST SERPL-MCNC: 198 MG/DL (ref 0–200)
CHOLEST/HDLC SERPL: 2.33 {RATIO}
CK SERPL-CCNC: 75 U/L (ref 20–180)
CO2 SERPL-SCNC: 28.3 MMOL/L (ref 22–29)
CREAT SERPL-MCNC: 0.82 MG/DL (ref 0.57–1)
EGFRCR SERPLBLD CKD-EPI 2021: 83.5 ML/MIN/1.73
GLOBULIN SER CALC-MCNC: 2 GM/DL
GLUCOSE SERPL-MCNC: 112 MG/DL (ref 65–99)
HDLC SERPL-MCNC: 85 MG/DL (ref 40–60)
LDLC SERPL CALC-MCNC: 100 MG/DL (ref 0–100)
POTASSIUM SERPL-SCNC: 4 MMOL/L (ref 3.5–5.2)
PROT SERPL-MCNC: 6.5 G/DL (ref 6–8.5)
SODIUM SERPL-SCNC: 145 MMOL/L (ref 136–145)
TRIGL SERPL-MCNC: 73 MG/DL (ref 0–150)
TSH SERPL DL<=0.005 MIU/L-ACNC: 1.77 UIU/ML (ref 0.27–4.2)
VLDLC SERPL CALC-MCNC: 13 MG/DL (ref 5–40)

## 2024-03-06 ENCOUNTER — OFFICE VISIT (OUTPATIENT)
Dept: FAMILY MEDICINE CLINIC | Facility: CLINIC | Age: 59
End: 2024-03-06
Payer: COMMERCIAL

## 2024-03-06 VITALS
RESPIRATION RATE: 18 BRPM | HEIGHT: 66 IN | HEART RATE: 83 BPM | WEIGHT: 224 LBS | TEMPERATURE: 98.2 F | BODY MASS INDEX: 36 KG/M2 | SYSTOLIC BLOOD PRESSURE: 140 MMHG | OXYGEN SATURATION: 96 % | DIASTOLIC BLOOD PRESSURE: 102 MMHG

## 2024-03-06 DIAGNOSIS — E66.01 MORBID OBESITY WITH BMI OF 40.0-44.9, ADULT: ICD-10-CM

## 2024-03-06 DIAGNOSIS — I10 PRIMARY HYPERTENSION: ICD-10-CM

## 2024-03-06 DIAGNOSIS — E78.00 PURE HYPERCHOLESTEROLEMIA: Primary | ICD-10-CM

## 2024-03-06 PROCEDURE — 99214 OFFICE O/P EST MOD 30 MIN: CPT | Performed by: INTERNAL MEDICINE

## 2024-03-06 RX ORDER — DESVENLAFAXINE SUCCINATE 50 MG/1
TABLET, EXTENDED RELEASE ORAL
COMMUNITY
Start: 2024-03-04

## 2024-03-06 RX ORDER — AMLODIPINE BESYLATE 5 MG/1
5 TABLET ORAL DAILY
Qty: 90 TABLET | Refills: 2 | Status: SHIPPED | OUTPATIENT
Start: 2024-03-06

## 2024-03-06 NOTE — PROGRESS NOTES
Subjective   Cayla Cole is a 58 y.o. female.     Chief Complaint   Patient presents with    Hypertension    Hyperlipidemia   Depression, morbid obesity    Hypertension  Pertinent negatives include no blurred vision, chest pain, palpitations or shortness of breath.   Hyperlipidemia  Pertinent negatives include no chest pain or shortness of breath.      Patient seen follow-up for hypertension, hyperlipidemia, depression, morbid obesity.  For depression she is seeing Dr. Mat De Leon.  Reports depression is under control with current medications.  When she checks her blood pressure has been normal.  She had a calcium scoring CAT scan of the heart done in 2021 that was 0 score.  The following portions of the patient's history were reviewed and updated as appropriate: allergies, current medications, past family history, past medical history, past social history, past surgical history and problem list.    Review of Systems   Constitutional:  Negative for activity change, appetite change, fatigue and fever.   Eyes:  Negative for blurred vision and double vision.   Respiratory: Negative.  Negative for shortness of breath.    Cardiovascular:  Negative for chest pain, palpitations and leg swelling.   Gastrointestinal: Negative.    Neurological:  Negative for dizziness, syncope, light-headedness and headache.       Allergies   Allergen Reactions    Nsaids Anaphylaxis       Current Outpatient Medications on File Prior to Visit   Medication Sig Dispense Refill    ALPRAZolam (XANAX) 0.5 MG tablet Take 1 tablet by mouth At Night As Needed.      atorvastatin (LIPITOR) 20 MG tablet Take 1 tablet by mouth Daily.      desvenlafaxine (PRISTIQ) 50 MG 24 hr tablet       fexofenadine (ALLEGRA) 180 MG tablet Take 1 tablet by mouth Daily.      fluticasone (FLONASE) 50 MCG/ACT nasal spray       lamoTRIgine (LaMICtal) 150 MG tablet Take 1 tablet by mouth Daily.      montelukast (SINGULAIR) 10 MG tablet Take 1 tablet by mouth Every Night.       omeprazole (priLOSEC) 40 MG capsule       OXcarbazepine (TRILEPTAL) 600 MG tablet Take 1 tablet by mouth Daily.      spironolactone (ALDACTONE) 25 MG tablet Take 1 tablet by mouth Daily.      [DISCONTINUED] Desvenlafaxine Succinate ER 25 MG tablet sustained-release 24 hour Take 1 tablet by mouth Daily.       No current facility-administered medications on file prior to visit.       Family History   Problem Relation Age of Onset    Rheum arthritis Mother     Kidney disease Mother     Mental illness Mother     Anxiety disorder Mother     Hypertension Father     Dementia Father     Heart murmur Sister     Mental illness Sister     Post-traumatic stress disorder Brother     No Known Problems Son     Cervical cancer Maternal Grandmother     Aneurysm Maternal Grandmother     No Known Problems Son        Past Medical History:   Diagnosis Date    Allergic     Anxiety     Arthritis     Depression     Diverticulosis     GERD (gastroesophageal reflux disease)     Headache     Hypertension     Kidney stone     Low back pain     Scoliosis     Visual impairment     Double vision probable cataract       Past Surgical History:   Procedure Laterality Date     SECTION      COLONOSCOPY  2016    KNEE MENISCAL REPAIR Left     LASER ABLATION OF THE CERVIX      WISDOM TOOTH EXTRACTION         Social History     Socioeconomic History    Marital status:    Tobacco Use    Smoking status: Never     Passive exposure: Never    Smokeless tobacco: Never   Vaping Use    Vaping status: Never Used   Substance and Sexual Activity    Alcohol use: Yes     Comment: OCCASION    Drug use: No    Sexual activity: Not Currently       Patient Active Problem List   Diagnosis    Anxiety    Lumbar radiculopathy    Morbid obesity with BMI of 40.0-44.9, adult    Synovial cyst of lumbar facet joint    Hyperlipidemia    Gastroesophageal reflux disease without esophagitis    Tension-type headache, not intractable    Recurrent major depressive  "disorder, in partial remission    Esophageal stricture    Need for vaccination    Murmur, cardiac    Primary hypertension       BP (!) 140/102   Pulse 83   Temp 98.2 °F (36.8 °C)   Resp 18   Ht 167.6 cm (65.98\")   Wt 102 kg (224 lb)   SpO2 96%   BMI 36.17 kg/m²   Body mass index is 36.17 kg/m².    Objective   Physical Exam  Vitals and nursing note reviewed.   Constitutional:       Appearance: She is well-developed.   Eyes:      Pupils: Pupils are equal, round, and reactive to light.   Neck:      Thyroid: No thyromegaly.      Vascular: No JVD.      Trachea: No tracheal deviation.   Cardiovascular:      Rate and Rhythm: Normal rate and regular rhythm.      Heart sounds: No murmur heard.  Pulmonary:      Effort: Pulmonary effort is normal. No respiratory distress.      Breath sounds: Normal breath sounds. No wheezing.   Abdominal:      General: Bowel sounds are normal. There is no distension.      Palpations: Abdomen is soft. There is no mass.      Tenderness: There is no abdominal tenderness. There is no right CVA tenderness, left CVA tenderness, guarding or rebound.      Hernia: No hernia is present.   Musculoskeletal:      Cervical back: Normal range of motion and neck supple.   Lymphadenopathy:      Cervical: No cervical adenopathy.   Neurological:      General: No focal deficit present.      Mental Status: She is alert and oriented to person, place, and time.   Psychiatric:         Mood and Affect: Mood normal.         Behavior: Behavior normal.         Assessment & Plan   Diagnoses and all orders for this visit:    1. Pure hypercholesterolemia (Primary)  -     CBC & Differential  -     CK  -     Comprehensive Metabolic Panel  -     Hemoglobin A1c  -     Lipid Panel With / Chol / HDL Ratio  -     TSH    2. Primary hypertension  -     CBC & Differential  -     CK  -     Comprehensive Metabolic Panel  -     Hemoglobin A1c  -     Lipid Panel With / Chol / HDL Ratio  -     TSH    3. Morbid obesity with BMI of " 40.0-44.9, adult  -     CBC & Differential  -     CK  -     Comprehensive Metabolic Panel  -     Hemoglobin A1c  -     Lipid Panel With / Chol / HDL Ratio  -     TSH    Other orders  -     amLODIPine (Norvasc) 5 MG tablet; Take 1 tablet by mouth Daily.  Dispense: 90 tablet; Refill: 2    Continue diet and exercise.  Continue taking Lipitor, Pristiq, Lamictal, singular, Prilosec, Trileptal, spironolactone.  Prilosec is helping her GERD symptoms.  Add Norvasc 5 daily.  Check blood pressure at home.  Return in 4 weeks time.  Discussed with patient mammograms, colonoscopies.  Discussed with patient regarding losing weight.  Per patient she had mammogram done November 2023.  She has a follow-up for abnormal mammogram.  For some reason epic is not picking up  EHR dragon/transcription disclaimer:  Part of this note are created by electronic transcription/translation of spoken language to printed text and thus may lead to erroneous, or at times, nonsensical words or phrases inadvertently transcribed.  Although I have reviewed for such errors, some may still exist.         Answers submitted by the patient for this visit:  Other (Submitted on 3/4/2024)  Please describe your symptoms.: Follow up  Have you had these symptoms before?: Yes  How long have you been having these symptoms?: 1-4 days  Primary Reason for Visit (Submitted on 3/4/2024)  What is the primary reason for your visit?: Other

## 2024-03-06 NOTE — PROGRESS NOTES
Venipuncture Blood Specimen Collection  Venipuncture performed in left arm by Vanessa Muhammad MA with good hemostasis. Patient tolerated the procedure well without complications.   03/06/24   Vanessa Muhammad MA

## 2024-03-07 LAB
ALBUMIN SERPL-MCNC: 4 G/DL (ref 3.5–5.2)
ALBUMIN/GLOB SERPL: 1.7 G/DL
ALP SERPL-CCNC: 131 U/L (ref 39–117)
ALT SERPL-CCNC: 22 U/L (ref 1–33)
AST SERPL-CCNC: 23 U/L (ref 1–32)
BASOPHILS # BLD AUTO: 0.08 10*3/MM3 (ref 0–0.2)
BASOPHILS NFR BLD AUTO: 1.2 % (ref 0–1.5)
BILIRUB SERPL-MCNC: <0.2 MG/DL (ref 0–1.2)
BUN SERPL-MCNC: 10 MG/DL (ref 6–20)
BUN/CREAT SERPL: 11.2 (ref 7–25)
CALCIUM SERPL-MCNC: 9.2 MG/DL (ref 8.6–10.5)
CHLORIDE SERPL-SCNC: 101 MMOL/L (ref 98–107)
CHOLEST SERPL-MCNC: 189 MG/DL (ref 0–200)
CHOLEST/HDLC SERPL: 2.01 {RATIO}
CK SERPL-CCNC: 151 U/L (ref 20–180)
CO2 SERPL-SCNC: 28.5 MMOL/L (ref 22–29)
CREAT SERPL-MCNC: 0.89 MG/DL (ref 0.57–1)
EGFRCR SERPLBLD CKD-EPI 2021: 75.3 ML/MIN/1.73
EOSINOPHIL # BLD AUTO: 0.31 10*3/MM3 (ref 0–0.4)
EOSINOPHIL NFR BLD AUTO: 4.7 % (ref 0.3–6.2)
ERYTHROCYTE [DISTWIDTH] IN BLOOD BY AUTOMATED COUNT: 12.5 % (ref 12.3–15.4)
GLOBULIN SER CALC-MCNC: 2.3 GM/DL
GLUCOSE SERPL-MCNC: 92 MG/DL (ref 65–99)
HBA1C MFR BLD: 5.8 % (ref 4.8–5.6)
HCT VFR BLD AUTO: 38.8 % (ref 34–46.6)
HDLC SERPL-MCNC: 94 MG/DL (ref 40–60)
HGB BLD-MCNC: 12.7 G/DL (ref 12–15.9)
IMM GRANULOCYTES # BLD AUTO: 0.01 10*3/MM3 (ref 0–0.05)
IMM GRANULOCYTES NFR BLD AUTO: 0.2 % (ref 0–0.5)
LDLC SERPL CALC-MCNC: 82 MG/DL (ref 0–100)
LYMPHOCYTES # BLD AUTO: 2.01 10*3/MM3 (ref 0.7–3.1)
LYMPHOCYTES NFR BLD AUTO: 30.4 % (ref 19.6–45.3)
MCH RBC QN AUTO: 27.5 PG (ref 26.6–33)
MCHC RBC AUTO-ENTMCNC: 32.7 G/DL (ref 31.5–35.7)
MCV RBC AUTO: 84 FL (ref 79–97)
MONOCYTES # BLD AUTO: 0.48 10*3/MM3 (ref 0.1–0.9)
MONOCYTES NFR BLD AUTO: 7.3 % (ref 5–12)
NEUTROPHILS # BLD AUTO: 3.73 10*3/MM3 (ref 1.7–7)
NEUTROPHILS NFR BLD AUTO: 56.2 % (ref 42.7–76)
NRBC BLD AUTO-RTO: 0 /100 WBC (ref 0–0.2)
PLATELET # BLD AUTO: 353 10*3/MM3 (ref 140–450)
POTASSIUM SERPL-SCNC: 4.4 MMOL/L (ref 3.5–5.2)
PROT SERPL-MCNC: 6.3 G/DL (ref 6–8.5)
RBC # BLD AUTO: 4.62 10*6/MM3 (ref 3.77–5.28)
SODIUM SERPL-SCNC: 141 MMOL/L (ref 136–145)
TRIGL SERPL-MCNC: 71 MG/DL (ref 0–150)
TSH SERPL DL<=0.005 MIU/L-ACNC: 2.12 UIU/ML (ref 0.27–4.2)
VLDLC SERPL CALC-MCNC: 13 MG/DL (ref 5–40)
WBC # BLD AUTO: 6.62 10*3/MM3 (ref 3.4–10.8)

## 2024-04-03 ENCOUNTER — OFFICE VISIT (OUTPATIENT)
Dept: FAMILY MEDICINE CLINIC | Facility: CLINIC | Age: 59
End: 2024-04-03
Payer: COMMERCIAL

## 2024-04-03 VITALS
BODY MASS INDEX: 35.03 KG/M2 | WEIGHT: 218 LBS | DIASTOLIC BLOOD PRESSURE: 88 MMHG | HEART RATE: 71 BPM | SYSTOLIC BLOOD PRESSURE: 132 MMHG | HEIGHT: 66 IN | RESPIRATION RATE: 20 BRPM | OXYGEN SATURATION: 94 %

## 2024-04-03 DIAGNOSIS — L72.9 SCALP CYST: ICD-10-CM

## 2024-04-03 DIAGNOSIS — I10 PRIMARY HYPERTENSION: Primary | ICD-10-CM

## 2024-04-03 DIAGNOSIS — E78.00 PURE HYPERCHOLESTEROLEMIA: ICD-10-CM

## 2024-04-03 PROCEDURE — 99213 OFFICE O/P EST LOW 20 MIN: CPT | Performed by: INTERNAL MEDICINE

## 2024-04-03 NOTE — PROGRESS NOTES
Subjective   Cayla Cole is a 58 y.o. female.     Chief Complaint   Patient presents with    Follow-up     Lump on crown of head   Hypertension    History of Present Illness   Patient here follow-up for hypertension.  Blood pressure is much better.  Taking Norvasc.  Taking other medications.  Also reports increasing size upper cyst on the scalp she had for several years.  But recently is has increased size.  No headaches or, numbness.  The following portions of the patient's history were reviewed and updated as appropriate: allergies, current medications, past family history, past medical history, past social history, past surgical history and problem list.    Review of Systems   Constitutional:  Negative for activity change, appetite change, fatigue and fever.   Eyes:  Negative for blurred vision and double vision.   Respiratory: Negative.  Negative for shortness of breath.    Cardiovascular:  Negative for chest pain, palpitations and leg swelling.   Skin:         Cyst on scalp   Neurological:  Negative for dizziness, syncope, light-headedness and headache.   Psychiatric/Behavioral:  Negative for agitation and behavioral problems.        Allergies   Allergen Reactions    Nsaids Anaphylaxis       Current Outpatient Medications on File Prior to Visit   Medication Sig Dispense Refill    ALPRAZolam (XANAX) 0.5 MG tablet Take 1 tablet by mouth At Night As Needed.      amLODIPine (Norvasc) 5 MG tablet Take 1 tablet by mouth Daily. 90 tablet 2    atorvastatin (LIPITOR) 20 MG tablet Take 1 tablet by mouth Daily.      desvenlafaxine (PRISTIQ) 50 MG 24 hr tablet       fexofenadine (ALLEGRA) 180 MG tablet Take 1 tablet by mouth Daily.      fluticasone (FLONASE) 50 MCG/ACT nasal spray       lamoTRIgine (LaMICtal) 150 MG tablet Take 1 tablet by mouth Daily.      montelukast (SINGULAIR) 10 MG tablet Take 1 tablet by mouth Every Night.      omeprazole (priLOSEC) 40 MG capsule       OXcarbazepine (TRILEPTAL) 600 MG tablet Take  1 tablet by mouth Daily.      spironolactone (ALDACTONE) 25 MG tablet Take 1 tablet by mouth Daily.       No current facility-administered medications on file prior to visit.       Family History   Problem Relation Age of Onset    Rheum arthritis Mother     Kidney disease Mother     Mental illness Mother     Anxiety disorder Mother     Hypertension Father     Dementia Father     Heart murmur Sister     Mental illness Sister     Post-traumatic stress disorder Brother     No Known Problems Son     Cervical cancer Maternal Grandmother     Aneurysm Maternal Grandmother     No Known Problems Son        Past Medical History:   Diagnosis Date    Allergic     Anxiety     Arthritis     Depression     Diverticulosis     GERD (gastroesophageal reflux disease)     Headache     Hypertension     Kidney stone     Low back pain     Scoliosis     Visual impairment     Double vision probable cataract       Past Surgical History:   Procedure Laterality Date     SECTION      COLONOSCOPY  2016    KNEE MENISCAL REPAIR Left     LASER ABLATION OF THE CERVIX      WISDOM TOOTH EXTRACTION         Social History     Socioeconomic History    Marital status:    Tobacco Use    Smoking status: Never     Passive exposure: Never    Smokeless tobacco: Never   Vaping Use    Vaping status: Never Used   Substance and Sexual Activity    Alcohol use: Yes     Comment: OCCASION    Drug use: No    Sexual activity: Not Currently       Patient Active Problem List   Diagnosis    Anxiety    Lumbar radiculopathy    Morbid obesity with BMI of 40.0-44.9, adult    Synovial cyst of lumbar facet joint    Hyperlipidemia    Gastroesophageal reflux disease without esophagitis    Tension-type headache, not intractable    Recurrent major depressive disorder, in partial remission    Esophageal stricture    Need for vaccination    Murmur, cardiac    Primary hypertension       /88 (BP Location: Left arm, Patient Position: Sitting, Cuff Size: Large  "Adult)   Pulse 71   Resp 20   Ht 167.6 cm (65.98\")   Wt 98.9 kg (218 lb)   SpO2 94%   BMI 35.20 kg/m²   Body mass index is 35.2 kg/m².    Objective   Physical Exam  Vitals and nursing note reviewed.   Constitutional:       Appearance: She is well-developed.   Eyes:      Pupils: Pupils are equal, round, and reactive to light.   Neck:      Thyroid: No thyromegaly.      Vascular: No JVD.      Trachea: No tracheal deviation.   Cardiovascular:      Rate and Rhythm: Normal rate and regular rhythm.      Heart sounds: No murmur heard.  Pulmonary:      Effort: Pulmonary effort is normal. No respiratory distress.      Breath sounds: Normal breath sounds. No wheezing.   Abdominal:      General: Bowel sounds are normal.      Palpations: Abdomen is soft.   Musculoskeletal:      Cervical back: Normal range of motion and neck supple.   Lymphadenopathy:      Cervical: No cervical adenopathy.   Skin:     Comments: Scalp with 2.5 to 3 cm raised, soft, nontender, no redness mass most likely cyst, less likely lipoma   Neurological:      General: No focal deficit present.      Mental Status: She is alert and oriented to person, place, and time.           Assessment & Plan   Diagnoses and all orders for this visit:    1. Primary hypertension (Primary)    2. Pure hypercholesterolemia    3. Scalp cyst  -     Ambulatory Referral to General Surgery    Continue diet and exercise.  Continue Norvasc.  Continue other medication including Lipitor Prilosec spironolactone.  Patient is also on antidepressants.  Refer to general surgery.  Needs excision of the cyst, less likely lipoma.  Patient has no symptoms.  I will see him back in couple months as scheduled.  Blood pressure is much better  EHR dragon/transcription disclaimer:  Part of this note are created by electronic transcription/translation of spoken language to printed text and thus may lead to erroneous, or at times, nonsensical words or phrases inadvertently transcribed.  Although I " have reviewed for such errors, some may still exist.

## 2024-04-17 ENCOUNTER — OFFICE VISIT (OUTPATIENT)
Dept: SURGERY | Facility: CLINIC | Age: 59
End: 2024-04-17
Payer: COMMERCIAL

## 2024-04-17 VITALS
HEIGHT: 66 IN | WEIGHT: 233 LBS | SYSTOLIC BLOOD PRESSURE: 122 MMHG | DIASTOLIC BLOOD PRESSURE: 78 MMHG | BODY MASS INDEX: 37.45 KG/M2

## 2024-04-17 DIAGNOSIS — L72.9 SCALP CYST: Primary | ICD-10-CM

## 2024-04-17 PROCEDURE — 99203 OFFICE O/P NEW LOW 30 MIN: CPT | Performed by: STUDENT IN AN ORGANIZED HEALTH CARE EDUCATION/TRAINING PROGRAM

## 2024-04-17 NOTE — PROGRESS NOTES
General Surgery History and Physical      Summary:    Cayla Cole is a 58 y.o. lady with a scalp cyst.  Discussed excision in the operating room.  The risks and benefits were discussed and she would like to proceed with surgery.    Referring Provider: Artemio Corral MD    Chief Complaint:    Scalp cyst    History of Present Illness:    Cayla Cole is a 58 y.o. lady who presents with a scalp cyst.  Is been present for a few years but is increasing in size now.  She has had 1 similar lesion on her arm previously.     Past Medical History:   GERD  HTN  HLD  Anxiety    Past Surgical History:    C section   L knee meniscal repair  Endometrial ablation   [place] tooth extraction   Colonoscopy 2016, 10 year plan     Family History:    No family history of colon cancer     Social History:    Denies tobacco use  Occ asional alcohol use    Allergies:   Allergies   Allergen Reactions    Nsaids Anaphylaxis       Medications:     Current Outpatient Medications:     ALPRAZolam (XANAX) 0.5 MG tablet, Take 1 tablet by mouth At Night As Needed., Disp: , Rfl:     amLODIPine (Norvasc) 5 MG tablet, Take 1 tablet by mouth Daily., Disp: 90 tablet, Rfl: 2    atorvastatin (LIPITOR) 20 MG tablet, Take 1 tablet by mouth Daily., Disp: , Rfl:     desvenlafaxine (PRISTIQ) 50 MG 24 hr tablet, , Disp: , Rfl:     fexofenadine (ALLEGRA) 180 MG tablet, Take 1 tablet by mouth Daily., Disp: , Rfl:     fluticasone (FLONASE) 50 MCG/ACT nasal spray, , Disp: , Rfl:     lamoTRIgine (LaMICtal) 150 MG tablet, Take 1 tablet by mouth Daily., Disp: , Rfl:     montelukast (SINGULAIR) 10 MG tablet, Take 1 tablet by mouth Every Night., Disp: , Rfl:     omeprazole (priLOSEC) 40 MG capsule, , Disp: , Rfl:     OXcarbazepine (TRILEPTAL) 600 MG tablet, Take 1 tablet by mouth Daily., Disp: , Rfl:     spironolactone (ALDACTONE) 25 MG tablet, Take 1 tablet by mouth Daily., Disp: , Rfl:     Radiology/Endoscopy:    No recent pertinent imaging     Labs:    Labs from  3/6/2024 reviewed    Review of Systems:   Influenza-like illness: no fever, no  cough, no  sore throat, no  body aches, no loss of sense of taste or smell, no known exposure to person with Covid-19.  Constitutional: Negative for fevers or chills  HENT: Negative for hearing loss or runny nose  Eyes: Negative for vision changes or scleral icterus  Respiratory: Negative for cough or shortness of breath  Cardiovascular: Negative for chest pain or heart palpitations  Gastrointestinal: Negative for abdominal pain, nausea, vomiting, constipation, melena, or hematochezia  Genitourinary: Negative for hematuria or dysuria  Musculoskeletal: Negative for joint swelling or gait instability  Neurologic: Negative for tremors or seizures  Psychiatric: Negative for suicidal ideations or depression  All other systems reviewed and negative    Physical Exam:   Constitutional: Well-developed well-nourished, no acute distress  Eyes: Conjunctiva normal, sclera nonicteric  ENMT: Hearing grossly normal, oral mucosa moist  Neck: Supple, trachea midline  Respiratory: Clear to auscultation, normal inspiratory effort  Cardiovascular: Regular rate, no peripheral edema, no jugular venous distention  Skin:  Warm, dry, no rash on visualized skin surfaces; 4 cm mobile cyst on the scalp  Musculoskeletal: Symmetric strength, normal gait  Psychiatric: Alert and oriented ×3, normal affect     Class 2 Severe Obesity (BMI >=35 and <=39.9). Obesity-related health conditions include the following: none. Obesity is unchanged. BMI is is above average; BMI management plan is completed. We discussed portion control and increasing exercise.       Yessenia Waller M.D.  General and Endoscopic Surgery  [facility]    [address] [place] [address], [address]  [place], [place], [address]  P: [phone]  F: [phone]

## 2024-04-19 ENCOUNTER — APPOINTMENT (OUTPATIENT)
Dept: WOMENS IMAGING | Facility: HOSPITAL | Age: 59
End: 2024-04-19
Payer: COMMERCIAL

## 2024-04-19 PROCEDURE — 77062 BREAST TOMOSYNTHESIS BI: CPT | Performed by: RADIOLOGY

## 2024-04-19 PROCEDURE — 77066 DX MAMMO INCL CAD BI: CPT | Performed by: RADIOLOGY

## 2024-04-19 PROCEDURE — G0279 TOMOSYNTHESIS, MAMMO: HCPCS | Performed by: RADIOLOGY

## 2024-04-23 ENCOUNTER — PREP FOR SURGERY (OUTPATIENT)
Dept: OTHER | Facility: HOSPITAL | Age: 59
End: 2024-04-23
Payer: COMMERCIAL

## 2024-04-23 DIAGNOSIS — L72.9 SCALP CYST: Primary | ICD-10-CM

## 2024-04-24 PROBLEM — L72.9 SCALP CYST: Status: ACTIVE | Noted: 2024-04-23

## 2024-06-07 ENCOUNTER — PRE-ADMISSION TESTING (OUTPATIENT)
Dept: PREADMISSION TESTING | Facility: HOSPITAL | Age: 59
End: 2024-06-07
Payer: COMMERCIAL

## 2024-06-07 VITALS
DIASTOLIC BLOOD PRESSURE: 89 MMHG | BODY MASS INDEX: 39.25 KG/M2 | HEART RATE: 70 BPM | RESPIRATION RATE: 16 BRPM | TEMPERATURE: 97.9 F | SYSTOLIC BLOOD PRESSURE: 150 MMHG | WEIGHT: 244.2 LBS | HEIGHT: 66 IN | OXYGEN SATURATION: 97 %

## 2024-06-07 LAB
ANION GAP SERPL CALCULATED.3IONS-SCNC: 9.5 MMOL/L (ref 5–15)
BUN SERPL-MCNC: 8 MG/DL (ref 6–20)
BUN/CREAT SERPL: 9.3 (ref 7–25)
CALCIUM SPEC-SCNC: 9.3 MG/DL (ref 8.6–10.5)
CHLORIDE SERPL-SCNC: 104 MMOL/L (ref 98–107)
CO2 SERPL-SCNC: 27.5 MMOL/L (ref 22–29)
CREAT SERPL-MCNC: 0.86 MG/DL (ref 0.57–1)
DEPRECATED RDW RBC AUTO: 39.7 FL (ref 37–54)
EGFRCR SERPLBLD CKD-EPI 2021: 78.4 ML/MIN/1.73
ERYTHROCYTE [DISTWIDTH] IN BLOOD BY AUTOMATED COUNT: 12.9 % (ref 12.3–15.4)
GLUCOSE SERPL-MCNC: 93 MG/DL (ref 65–99)
HCT VFR BLD AUTO: 37.5 % (ref 34–46.6)
HGB BLD-MCNC: 12.8 G/DL (ref 12–15.9)
MCH RBC QN AUTO: 29.2 PG (ref 26.6–33)
MCHC RBC AUTO-ENTMCNC: 34.1 G/DL (ref 31.5–35.7)
MCV RBC AUTO: 85.4 FL (ref 79–97)
PLATELET # BLD AUTO: 292 10*3/MM3 (ref 140–450)
PMV BLD AUTO: 8.6 FL (ref 6–12)
POTASSIUM SERPL-SCNC: 4 MMOL/L (ref 3.5–5.2)
RBC # BLD AUTO: 4.39 10*6/MM3 (ref 3.77–5.28)
SODIUM SERPL-SCNC: 141 MMOL/L (ref 136–145)
WBC NRBC COR # BLD AUTO: 4.33 10*3/MM3 (ref 3.4–10.8)

## 2024-06-07 PROCEDURE — 36415 COLL VENOUS BLD VENIPUNCTURE: CPT

## 2024-06-07 PROCEDURE — 80048 BASIC METABOLIC PNL TOTAL CA: CPT

## 2024-06-07 PROCEDURE — 85027 COMPLETE CBC AUTOMATED: CPT

## 2024-06-07 RX ORDER — ASCORBIC ACID 500 MG
500 TABLET ORAL DAILY
COMMUNITY

## 2024-06-07 RX ORDER — MULTIVITAMIN WITH IRON
TABLET ORAL
COMMUNITY

## 2024-06-07 NOTE — DISCHARGE INSTRUCTIONS
Take the following medications the morning of surgery:  AMLODIPINE AND PRISTIQ    If you are on prescription narcotic pain medication to control your pain you may also take that medication the morning of surgery.    General Instructions:  Do not eat solid food after midnight the night before surgery.  You may drink clear liquids day of surgery but must stop at least one hour before your hospital arrival time.  It is beneficial for you to have a clear drink that contains carbohydrates the day of surgery.  We suggest a 12 to 20 ounce bottle of Gatorade or Powerade for non-diabetic patients or a 12 to 20 ounce bottle of G2 or Powerade Zero for diabetic patients. (Pediatric patients, are not advised to drink a 12 to 20 ounce carbohydrate drink)    Clear liquids are liquids you can see through.  Nothing red in color.     Plain water                               Sports drinks  Sodas                                   Gelatin (Jell-O)  Fruit juices without pulp such as white grape juice and apple juice  Popsicles that contain no fruit or yogurt  Tea or coffee (no cream or milk added)  Gatorade / Powerade  G2 / Powerade Zero    Infants may have breast milk up to four hours before surgery.  Infants drinking formula may drink formula up to six hours before surgery.   Patients who avoid smoking, chewing tobacco and alcohol for 4 weeks prior to surgery have a reduced risk of post-operative complications.  Quit smoking as many days before surgery as you can.  Do not smoke, use chewing tobacco or drink alcohol the day of surgery.   If applicable bring your C-PAP/ BI-PAP machine in with you to preop day of surgery.  Bring any papers given to you in the doctor’s office.  Wear clean comfortable clothes.  Do not wear contact lenses, false eyelashes or make-up.  Bring a case for your glasses.   Bring crutches or walker if applicable.  Remove all piercings.  Leave jewelry and any other valuables at home.  Hair extensions with metal  clips must be removed prior to surgery.  The Pre-Admission Testing nurse will instruct you to bring medications if unable to obtain an accurate list in Pre-Admission Testing.        If you were given a blood bank ID arm band remember to bring it with you the day of surgery.    Preventing a Surgical Site Infection:  For 2 to 3 days before surgery, avoid shaving with a razor because the razor can irritate skin and make it easier to develop an infection.    Any areas of open skin can increase the risk of a post-operative wound infection by allowing bacteria to enter and travel throughout the body.  Notify your surgeon if you have any skin wounds / rashes even if it is not near the expected surgical site.  The area will need assessed to determine if surgery should be delayed until it is healed.  The night prior to surgery shower using a fresh bar of anti-bacterial soap (such as Dial) and clean washcloth.  Sleep in a clean bed with clean clothing.  Do not allow pets to sleep with you.  Shower on the morning of surgery using a fresh bar of anti-bacterial soap (such as Dial) and clean washcloth.  Dry with a clean towel and dress in clean clothing.  Ask your surgeon if you will be receiving antibiotics prior to surgery.  Make sure you, your family, and all healthcare providers clean their hands with soap and water or an alcohol based hand  before caring for you or your wound.    Day of surgery:  Your arrival time is approximately two hours before your scheduled surgery time.  Upon arrival, a Pre-op nurse and Anesthesiologist will review your health history, obtain vital signs, and answer questions you may have.  The only belongings needed at this time will be a list of your home medications and if applicable your C-PAP/BI-PAP machine.  A Pre-op nurse will start an IV and you may receive medication in preparation for surgery, including something to help you relax.     Please be aware that surgery does come with  discomfort.  We want to make every effort to control your discomfort so please discuss any uncontrolled symptoms with your nurse.   Your doctor will most likely have prescribed pain medications.      If you are going home after surgery you will receive individualized written care instructions before being discharged.  A responsible adult must drive you to and from the hospital on the day of your surgery and ideally stay with you through the night.   .  Discharge prescriptions can be filled by the hospital pharmacy during regular pharmacy hours.  If you are having surgery late in the day/evening your prescription may be e-prescribed to your pharmacy.  Please verify your pharmacy hours or chose a 24 hour pharmacy to avoid not having access to your prescription because your pharmacy has closed for the day.    If you are staying overnight following surgery, you will be transported to your hospital room following the recovery period.  [facility] has all private rooms.    If you have any questions please call Pre-Admission Testing at [phone].  Deductibles and co-payments are collected on the day of service. Please be prepared to pay the required co-pay, deductible or deposit on the day of service as defined by your plan.    Call your surgeon immediately if you experience any of the following symptoms:  Sore Throat  Shortness of Breath or difficulty breathing  Cough  Chills  Body soreness or muscle pain  Headache  Fever  New loss of taste or smell  Do not arrive for your surgery ill.  Your procedure will need to be rescheduled to another time.  You will need to call your physician before the day of surgery to avoid any unnecessary exposure to hospital staff as well as other patients.

## 2024-06-14 ENCOUNTER — HOSPITAL ENCOUNTER (OUTPATIENT)
Facility: HOSPITAL | Age: 59
Setting detail: HOSPITAL OUTPATIENT SURGERY
Discharge: HOME | End: 2024-06-14
Attending: STUDENT IN AN ORGANIZED HEALTH CARE EDUCATION/TRAINING PROGRAM | Admitting: STUDENT IN AN ORGANIZED HEALTH CARE EDUCATION/TRAINING PROGRAM
Payer: COMMERCIAL

## 2024-06-14 ENCOUNTER — ANESTHESIA EVENT (OUTPATIENT)
Dept: PERIOP | Facility: HOSPITAL | Age: 59
End: 2024-06-14
Payer: COMMERCIAL

## 2024-06-14 ENCOUNTER — ANESTHESIA (OUTPATIENT)
Dept: PERIOP | Facility: HOSPITAL | Age: 59
End: 2024-06-14
Payer: COMMERCIAL

## 2024-06-14 VITALS
OXYGEN SATURATION: 97 % | SYSTOLIC BLOOD PRESSURE: 163 MMHG | RESPIRATION RATE: 16 BRPM | DIASTOLIC BLOOD PRESSURE: 86 MMHG | HEART RATE: 71 BPM | TEMPERATURE: 97.6 F

## 2024-06-14 DIAGNOSIS — L72.9 SCALP CYST: ICD-10-CM

## 2024-06-14 PROCEDURE — 11424 EXC H-F-NK-SP B9+MARG 3.1-4: CPT | Performed by: STUDENT IN AN ORGANIZED HEALTH CARE EDUCATION/TRAINING PROGRAM

## 2024-06-14 PROCEDURE — 25810000003 LACTATED RINGERS PER 1000 ML: Performed by: ANESTHESIOLOGY

## 2024-06-14 PROCEDURE — 25010000002 PROPOFOL 500 MG/50ML EMULSION: Performed by: NURSE ANESTHETIST, CERTIFIED REGISTERED

## 2024-06-14 PROCEDURE — 88304 TISSUE EXAM BY PATHOLOGIST: CPT | Performed by: STUDENT IN AN ORGANIZED HEALTH CARE EDUCATION/TRAINING PROGRAM

## 2024-06-14 PROCEDURE — 25010000002 CEFAZOLIN PER 500 MG: Performed by: STUDENT IN AN ORGANIZED HEALTH CARE EDUCATION/TRAINING PROGRAM

## 2024-06-14 RX ORDER — PROPOFOL 10 MG/ML
INJECTION, EMULSION INTRAVENOUS CONTINUOUS PRN
Status: DISCONTINUED | OUTPATIENT
Start: 2024-06-14 | End: 2024-06-14 | Stop reason: SURG

## 2024-06-14 RX ORDER — EPHEDRINE SULFATE 50 MG/ML
5 INJECTION, SOLUTION INTRAVENOUS ONCE AS NEEDED
Status: DISCONTINUED | OUTPATIENT
Start: 2024-06-14 | End: 2024-06-14 | Stop reason: HOSPADM

## 2024-06-14 RX ORDER — SODIUM CHLORIDE 0.9 % (FLUSH) 0.9 %
3-10 SYRINGE (ML) INJECTION AS NEEDED
Status: DISCONTINUED | OUTPATIENT
Start: 2024-06-14 | End: 2024-06-14 | Stop reason: HOSPADM

## 2024-06-14 RX ORDER — IPRATROPIUM BROMIDE AND ALBUTEROL SULFATE 2.5; .5 MG/3ML; MG/3ML
3 SOLUTION RESPIRATORY (INHALATION) ONCE AS NEEDED
Status: DISCONTINUED | OUTPATIENT
Start: 2024-06-14 | End: 2024-06-14 | Stop reason: HOSPADM

## 2024-06-14 RX ORDER — FLUMAZENIL 0.1 MG/ML
0.2 INJECTION INTRAVENOUS AS NEEDED
Status: DISCONTINUED | OUTPATIENT
Start: 2024-06-14 | End: 2024-06-14 | Stop reason: HOSPADM

## 2024-06-14 RX ORDER — PROMETHAZINE HYDROCHLORIDE 25 MG/1
25 SUPPOSITORY RECTAL ONCE AS NEEDED
Status: DISCONTINUED | OUTPATIENT
Start: 2024-06-14 | End: 2024-06-14 | Stop reason: HOSPADM

## 2024-06-14 RX ORDER — PROMETHAZINE HYDROCHLORIDE 25 MG/1
25 TABLET ORAL ONCE AS NEEDED
Status: DISCONTINUED | OUTPATIENT
Start: 2024-06-14 | End: 2024-06-14 | Stop reason: HOSPADM

## 2024-06-14 RX ORDER — NALOXONE HCL 0.4 MG/ML
0.2 VIAL (ML) INJECTION AS NEEDED
Status: DISCONTINUED | OUTPATIENT
Start: 2024-06-14 | End: 2024-06-14 | Stop reason: HOSPADM

## 2024-06-14 RX ORDER — HYDROMORPHONE HYDROCHLORIDE 1 MG/ML
0.25 INJECTION, SOLUTION INTRAMUSCULAR; INTRAVENOUS; SUBCUTANEOUS
Status: DISCONTINUED | OUTPATIENT
Start: 2024-06-14 | End: 2024-06-14 | Stop reason: HOSPADM

## 2024-06-14 RX ORDER — SODIUM CHLORIDE, SODIUM LACTATE, POTASSIUM CHLORIDE, CALCIUM CHLORIDE 600; 310; 30; 20 MG/100ML; MG/100ML; MG/100ML; MG/100ML
9 INJECTION, SOLUTION INTRAVENOUS CONTINUOUS
Status: DISCONTINUED | OUTPATIENT
Start: 2024-06-14 | End: 2024-06-14 | Stop reason: HOSPADM

## 2024-06-14 RX ORDER — HYDRALAZINE HYDROCHLORIDE 20 MG/ML
5 INJECTION INTRAMUSCULAR; INTRAVENOUS
Status: DISCONTINUED | OUTPATIENT
Start: 2024-06-14 | End: 2024-06-14 | Stop reason: HOSPADM

## 2024-06-14 RX ORDER — DROPERIDOL 2.5 MG/ML
0.62 INJECTION, SOLUTION INTRAMUSCULAR; INTRAVENOUS
Status: DISCONTINUED | OUTPATIENT
Start: 2024-06-14 | End: 2024-06-14 | Stop reason: HOSPADM

## 2024-06-14 RX ORDER — BUPIVACAINE HYDROCHLORIDE AND EPINEPHRINE 5; 5 MG/ML; UG/ML
INJECTION, SOLUTION EPIDURAL; INTRACAUDAL; PERINEURAL AS NEEDED
Status: DISCONTINUED | OUTPATIENT
Start: 2024-06-14 | End: 2024-06-14 | Stop reason: HOSPADM

## 2024-06-14 RX ORDER — LABETALOL HYDROCHLORIDE 5 MG/ML
5 INJECTION, SOLUTION INTRAVENOUS
Status: DISCONTINUED | OUTPATIENT
Start: 2024-06-14 | End: 2024-06-14 | Stop reason: HOSPADM

## 2024-06-14 RX ORDER — FENTANYL CITRATE 50 UG/ML
25 INJECTION, SOLUTION INTRAMUSCULAR; INTRAVENOUS
Status: DISCONTINUED | OUTPATIENT
Start: 2024-06-14 | End: 2024-06-14 | Stop reason: HOSPADM

## 2024-06-14 RX ORDER — FAMOTIDINE 10 MG/ML
20 INJECTION, SOLUTION INTRAVENOUS ONCE
Status: COMPLETED | OUTPATIENT
Start: 2024-06-14 | End: 2024-06-14

## 2024-06-14 RX ORDER — LIDOCAINE HYDROCHLORIDE 10 MG/ML
0.5 INJECTION, SOLUTION INFILTRATION; PERINEURAL ONCE AS NEEDED
Status: DISCONTINUED | OUTPATIENT
Start: 2024-06-14 | End: 2024-06-14 | Stop reason: HOSPADM

## 2024-06-14 RX ORDER — LIDOCAINE HYDROCHLORIDE 20 MG/ML
INJECTION, SOLUTION INFILTRATION; PERINEURAL AS NEEDED
Status: DISCONTINUED | OUTPATIENT
Start: 2024-06-14 | End: 2024-06-14 | Stop reason: SURG

## 2024-06-14 RX ORDER — SODIUM CHLORIDE 0.9 % (FLUSH) 0.9 %
3 SYRINGE (ML) INJECTION EVERY 12 HOURS SCHEDULED
Status: DISCONTINUED | OUTPATIENT
Start: 2024-06-14 | End: 2024-06-14 | Stop reason: HOSPADM

## 2024-06-14 RX ORDER — HYDROCODONE BITARTRATE AND ACETAMINOPHEN 7.5; 325 MG/1; MG/1
1 TABLET ORAL EVERY 4 HOURS PRN
Status: DISCONTINUED | OUTPATIENT
Start: 2024-06-14 | End: 2024-06-14 | Stop reason: HOSPADM

## 2024-06-14 RX ORDER — MAGNESIUM HYDROXIDE 1200 MG/15ML
LIQUID ORAL AS NEEDED
Status: DISCONTINUED | OUTPATIENT
Start: 2024-06-14 | End: 2024-06-14 | Stop reason: HOSPADM

## 2024-06-14 RX ORDER — ONDANSETRON 2 MG/ML
4 INJECTION INTRAMUSCULAR; INTRAVENOUS ONCE AS NEEDED
Status: DISCONTINUED | OUTPATIENT
Start: 2024-06-14 | End: 2024-06-14 | Stop reason: HOSPADM

## 2024-06-14 RX ORDER — MIDAZOLAM HYDROCHLORIDE 1 MG/ML
1 INJECTION INTRAMUSCULAR; INTRAVENOUS
Status: DISCONTINUED | OUTPATIENT
Start: 2024-06-14 | End: 2024-06-14 | Stop reason: HOSPADM

## 2024-06-14 RX ORDER — DIPHENHYDRAMINE HYDROCHLORIDE 50 MG/ML
12.5 INJECTION INTRAMUSCULAR; INTRAVENOUS
Status: DISCONTINUED | OUTPATIENT
Start: 2024-06-14 | End: 2024-06-14 | Stop reason: HOSPADM

## 2024-06-14 RX ORDER — KETAMINE HCL IN NACL, ISO-OSM 100MG/10ML
SYRINGE (ML) INJECTION AS NEEDED
Status: DISCONTINUED | OUTPATIENT
Start: 2024-06-14 | End: 2024-06-14 | Stop reason: SURG

## 2024-06-14 RX ORDER — HYDROCODONE BITARTRATE AND ACETAMINOPHEN 5; 325 MG/1; MG/1
1 TABLET ORAL ONCE AS NEEDED
Status: DISCONTINUED | OUTPATIENT
Start: 2024-06-14 | End: 2024-06-14 | Stop reason: HOSPADM

## 2024-06-14 RX ADMIN — Medication 20 MG: at 08:05

## 2024-06-14 RX ADMIN — LIDOCAINE HYDROCHLORIDE 60 MG: 20 INJECTION, SOLUTION EPIDURAL; INFILTRATION; INTRACAUDAL; PERINEURAL at 08:05

## 2024-06-14 RX ADMIN — PROPOFOL 20 MG: 10 INJECTION, EMULSION INTRAVENOUS at 08:06

## 2024-06-14 RX ADMIN — PROPOFOL 150 MCG/KG/MIN: 10 INJECTION, EMULSION INTRAVENOUS at 08:05

## 2024-06-14 RX ADMIN — PROPOFOL 20 MG: 10 INJECTION, EMULSION INTRAVENOUS at 08:17

## 2024-06-14 RX ADMIN — SODIUM CHLORIDE, POTASSIUM CHLORIDE, SODIUM LACTATE AND CALCIUM CHLORIDE 9 ML/HR: 600; 310; 30; 20 INJECTION, SOLUTION INTRAVENOUS at 06:57

## 2024-06-14 RX ADMIN — FAMOTIDINE 20 MG: 10 INJECTION INTRAVENOUS at 07:15

## 2024-06-14 RX ADMIN — SODIUM CHLORIDE 2000 MG: 900 INJECTION INTRAVENOUS at 07:56

## 2024-06-14 RX ADMIN — Medication 10 MG: at 08:20

## 2024-06-14 NOTE — OP NOTE
OPERATIVE REPORT    DATE: 6/14/2024    SURGEON: Yessenia Waller MD     OPERATION PERFORMED: Excision of scalp cyst    PREOPERATIVE DIAGNOSIS: Scalp Cyst    POSTOPERATIVE DIAGNOSIS: Same    ANESTHESIA: MAC    SPECIMEN: Scalp cyst    DRAINS: None    BLOOD LOSS: Minimal    INDICATION FOR OPERATION: Mrs. Cayla Cole is a 58 y.o. lady who was recently seen in the office with a large scalp cyst that was increasing in size. All risks (including bleeding, infection, damage to surrounding structures, recurrence), benefits and alternatives were explained to the patient who agreed and wished to proceed. Informed consent was signed.     OPERATIVE COURSE: The patient was taken to the operating room, transferred onto the operating room table, and underwent anesthesia without incident. The patient was prepped and draped in sterile fashion. A time out was performed and preoperative antibiotics were given.  1% lidocaine with epinephrine was injected into the skin and subcutaneous tissue.  Incision was made over the palpable cyst.  There was a 4 cm cyst containing fluid.  It was completely removed.  The sac was completely dissected free and passed off for specimen.  The area was hemostatic.  It was closed with a running 4-0 Monocryl suture.  The patient tolerated the procedure well. All needle and lap counts were correct at the end of the case. The patient was then awoken from anesthesia and taken to recovery for further monitoring.          Yessenia Waller MD   General and Endoscopic Surgery  [facility]    [address] [place] [address], [address]  [place], [place], [address]  P: [phone]  F: [phone]

## 2024-06-14 NOTE — ANESTHESIA PREPROCEDURE EVALUATION
Anesthesia Evaluation     Patient summary reviewed and Nursing notes reviewed   no history of anesthetic complications:   NPO Solid Status: > 8 hours  NPO Liquid Status: > 2 hours           Airway   Mallampati: III  TM distance: >3 FB  Neck ROM: full  Possible difficult intubation and Large neck circumference  Dental    (+) implants        Pulmonary - normal exam   Cardiovascular - normal exam  Exercise tolerance: good (4-7 METS)    ECG reviewed    (+) hypertension, valvular problems/murmurs murmur, hyperlipidemia      Neuro/Psych  (+) headaches, numbness (lumbar radic), psychiatric history Depression and Anxiety  GI/Hepatic/Renal/Endo    (+) obesity, morbid obesity, GERD well controlled    Musculoskeletal     Abdominal   (+) obese   Substance History      OB/GYN          Other   arthritis,                   Anesthesia Plan    ASA 3     MAC     (I discussed MAC vs GA for this, patient understands MAC has the potential to become GA if necessary, and awareness during MAC is much more common than during GA. I have reviewed the patient's history and chart with the patient, including all pertinent laboratory results and imaging. I have explained the risks of anesthesia including but not limited to dental damage, corneal abrasion, nerve injury, MI, stroke, aspiration, and death. Patient has agreed to proceed.      )  intravenous induction     Anesthetic plan, risks, benefits, and alternatives have been provided, discussed and informed consent has been obtained with: patient.    CODE STATUS:

## 2024-06-14 NOTE — DISCHARGE INSTRUCTIONS
Outpatient Surgery Guidelines, Adult  Outpatient procedures are those for which the person having the procedure is allowed to go home the same day as the procedure. Various procedures are done on an outpatient basis. You should follow some general guidelines if you will be having an outpatient procedure.  AFTER THE  PROCEDURE  After surgery, you will be taken to a recovery area, where your progress will be monitored. If there are no complications, you will be allowed to go home when you are awake, stable, and taking fluids well. You may have numbness around the surgical site. Healing will take some time. You will have tenderness at the surgical site and may have some swelling and bruising. You may also have some nausea.  HOME CARE INSTRUCTIONS  Do not drive for 24 hours, or as directed by your health care provider. Do not drive while taking prescription pain medicines.  Do not drink alcohol for 24 hours.  Do not make important decisions or sign legal documents for 24 hours.  Plan on having a responsible adult stay with your for 24 hours following your procedure.  You may resume a normal diet and activities as directed.  Do not lift anything heavier than 10 pounds (4.5 kg) or play contact sports until your health care provider says it is okay.  Only take over-the-counter or prescription medicines as directed by your health care provider.  Follow up with your health care provider as directed.  If you have sleep apnea, surgery and certain medicines can increase your risk for breathing problems. Follow instructions from your health care provider about wearing your sleep device:  Anytime you are sleeping, including during daytime naps.  While taking prescription pain medicines, sleeping medicines, or medicines that make you drowsy.    SEEK MEDICAL CARE IF:  You have increased bleeding (more than a small spot) from the surgical site.  You have redness, swelling, or increasing pain in the wound.  You see pus coming from  the wound.  You have a fever > 101.  You notice a bad smell coming from the wound or dressing.  You feel lightheaded or faint.  You develop a rash.  You have trouble breathing.  You develop allergies.  MAKE SURE YOU:  Understand these instructions.  Will watch your condition.  Will get help right away if you are not doing well or get worse.

## 2024-06-14 NOTE — H&P
Copied text in this note has been reviewed by me and remains accurate as 6/14/2024    General Surgery History and Physical       Summary:    Cayla Cole is a 58 y.o. lady with a scalp cyst.  Discussed excision in the operating room.  The risks and benefits were discussed and she would like to proceed with surgery.     Referring Provider: Artemio Corral MD     Chief Complaint:    Scalp cyst     History of Present Illness:    Cayla Cole is a 58 y.o. lady who presents with a scalp cyst.  Is been present for a few years but is increasing in size now.  She has had 1 similar lesion on her arm previously.      Past Medical History:   GERD  HTN  HLD  Anxiety     Past Surgical History:    C section   L knee meniscal repair  Endometrial ablation   Brashear tooth extraction   Colonoscopy 2016, 10 year plan      Family History:    No family history of colon cancer      Social History:    Denies tobacco use  Occ asional alcohol use     Allergies:   Allergies        Allergies   Allergen Reactions    Nsaids Anaphylaxis            Medications:     Current Medications      Current Outpatient Medications:     ALPRAZolam (XANAX) 0.5 MG tablet, Take 1 tablet by mouth At Night As Needed., Disp: , Rfl:     amLODIPine (Norvasc) 5 MG tablet, Take 1 tablet by mouth Daily., Disp: 90 tablet, Rfl: 2    atorvastatin (LIPITOR) 20 MG tablet, Take 1 tablet by mouth Daily., Disp: , Rfl:     desvenlafaxine (PRISTIQ) 50 MG 24 hr tablet, , Disp: , Rfl:     fexofenadine (ALLEGRA) 180 MG tablet, Take 1 tablet by mouth Daily., Disp: , Rfl:     fluticasone (FLONASE) 50 MCG/ACT nasal spray, , Disp: , Rfl:     lamoTRIgine (LaMICtal) 150 MG tablet, Take 1 tablet by mouth Daily., Disp: , Rfl:     montelukast (SINGULAIR) 10 MG tablet, Take 1 tablet by mouth Every Night., Disp: , Rfl:     omeprazole (priLOSEC) 40 MG capsule, , Disp: , Rfl:     OXcarbazepine (TRILEPTAL) 600 MG tablet, Take 1 tablet by mouth Daily., Disp: , Rfl:     spironolactone  (ALDACTONE) 25 MG tablet, Take 1 tablet by mouth Daily., Disp: , Rfl:         Radiology/Endoscopy:    No recent pertinent imaging      Labs:    Labs from 3/6/2024 reviewed     Review of Systems:   Influenza-like illness: no fever, no  cough, no  sore throat, no  body aches, no loss of sense of taste or smell, no known exposure to person with Covid-19.  Constitutional: Negative for fevers or chills  HENT: Negative for hearing loss or runny nose  Eyes: Negative for vision changes or scleral icterus  Respiratory: Negative for cough or shortness of breath  Cardiovascular: Negative for chest pain or heart palpitations  Gastrointestinal: Negative for abdominal pain, nausea, vomiting, constipation, melena, or hematochezia  Genitourinary: Negative for hematuria or dysuria  Musculoskeletal: Negative for joint swelling or gait instability  Neurologic: Negative for tremors or seizures  Psychiatric: Negative for suicidal ideations or depression  All other systems reviewed and negative     Physical Exam:   Constitutional: Well-developed well-nourished, no acute distress  Eyes: Conjunctiva normal, sclera nonicteric  ENMT: Hearing grossly normal, oral mucosa moist  Neck: Supple, trachea midline  Respiratory: Clear to auscultation, normal inspiratory effort  Cardiovascular: Regular rate, no peripheral edema, no jugular venous distention  Skin:  Warm, dry, no rash on visualized skin surfaces; 4 cm mobile cyst on the scalp  Musculoskeletal: Symmetric strength, normal gait  Psychiatric: Alert and oriented ×3, normal affect      Class 2 Severe Obesity (BMI >=35 and <=39.9). Obesity-related health conditions include the following: none. Obesity is unchanged. BMI is is above average; BMI management plan is completed. We discussed portion control and increasing exercise.        Yessenia Waller M.D.  General and Endoscopic Surgery  [facility]     [address], [address]  [place], [place], [address]  P: [phone]  F:  [phone]

## 2024-06-14 NOTE — ANESTHESIA POSTPROCEDURE EVALUATION
Patient: Cayla Cole    Procedure Summary       Date: 06/14/24 Room / Location:  [facility]  [facility]    Anesthesia Start: 0802 Anesthesia Stop: 0831    Procedure: Scalp cyst excision Diagnosis:       Scalp cyst      (Scalp cyst [L72.9])    Surgeons: Yessenia Waller MD Provider: Abhinav Dillard MD    Anesthesia Type: MAC ASA Status: 3            Anesthesia Type: MAC    Vitals  Vitals Value Taken Time   /86 06/14/24 0840   Temp     Pulse     Resp     SpO2     Vitals shown include unfiled device data.        Post Anesthesia Care and Evaluation    Patient location during evaluation: bedside  Patient participation: complete - patient participated  Level of consciousness: sleepy but conscious  Pain management: adequate    Airway patency: patent  Anesthetic complications: No anesthetic complications    Cardiovascular status: acceptable  Respiratory status: acceptable  Hydration status: acceptable    Comments: */70 (BP Location: Left arm, Patient Position: Sitting)   Pulse 76   Temp 36.4 °C (97.6 °F) (Oral)   Resp 16   LMP  (LMP Unknown)   SpO2 97%

## 2024-06-17 LAB
LAB AP CASE REPORT: NORMAL
PATH REPORT.FINAL DX SPEC: NORMAL
PATH REPORT.GROSS SPEC: NORMAL

## 2024-07-10 ENCOUNTER — OFFICE VISIT (OUTPATIENT)
Dept: FAMILY MEDICINE CLINIC | Facility: CLINIC | Age: 59
End: 2024-07-10
Payer: COMMERCIAL

## 2024-07-10 VITALS
WEIGHT: 249 LBS | DIASTOLIC BLOOD PRESSURE: 82 MMHG | HEIGHT: 66 IN | RESPIRATION RATE: 18 BRPM | BODY MASS INDEX: 40.02 KG/M2 | HEART RATE: 80 BPM | OXYGEN SATURATION: 98 % | SYSTOLIC BLOOD PRESSURE: 134 MMHG

## 2024-07-10 DIAGNOSIS — M54.2 NECK PAIN: ICD-10-CM

## 2024-07-10 DIAGNOSIS — R20.2 PARESTHESIA OF BOTH HANDS: ICD-10-CM

## 2024-07-10 DIAGNOSIS — E78.00 PURE HYPERCHOLESTEROLEMIA: Primary | ICD-10-CM

## 2024-07-10 DIAGNOSIS — I10 PRIMARY HYPERTENSION: ICD-10-CM

## 2024-07-10 DIAGNOSIS — G44.229 CHRONIC TENSION-TYPE HEADACHE, NOT INTRACTABLE: ICD-10-CM

## 2024-07-10 DIAGNOSIS — K21.9 GASTROESOPHAGEAL REFLUX DISEASE WITHOUT ESOPHAGITIS: ICD-10-CM

## 2024-07-10 DIAGNOSIS — R73.9 HYPERGLYCEMIA: ICD-10-CM

## 2024-07-10 DIAGNOSIS — E66.01 MORBID OBESITY WITH BMI OF 40.0-44.9, ADULT: ICD-10-CM

## 2024-07-10 DIAGNOSIS — F33.41 RECURRENT MAJOR DEPRESSIVE DISORDER, IN PARTIAL REMISSION: ICD-10-CM

## 2024-07-10 PROCEDURE — 36415 COLL VENOUS BLD VENIPUNCTURE: CPT | Performed by: INTERNAL MEDICINE

## 2024-07-10 PROCEDURE — 99214 OFFICE O/P EST MOD 30 MIN: CPT | Performed by: INTERNAL MEDICINE

## 2024-07-10 NOTE — PROGRESS NOTES
Venipuncture Blood Specimen Collection  Venipuncture performed in left arm by Vanessa Muhammad MA with good hemostasis. Patient tolerated the procedure well without complications.   07/10/24   Vanessa Muhammad MA

## 2024-07-10 NOTE — PROGRESS NOTES
Subjective   Cayla Cole is a 58 y.o. female.     Chief Complaint   Patient presents with    3 month follow up    Neck Pain     Tingling and numbness on left side    Med Refill     Atorvastatin       Neck Pain   Pertinent negatives include no chest pain or fever.      Patient here seen today for hypertension, hyperlipidemia, morbid obesity, depression.  Also complaining of gaining weight.  She was recently started on amlodipine.  Patient also on Pristiq this reason.  Her depression is much better with the current medication.  She follows with a psychiatrist.  Complains of neck pain, left trapezius pain with left arm and fingers tingling.  She also complains of bilateral decreased , pain and numbness for years.  She related that to her daily repeated motions.  The following portions of the patient's history were reviewed and updated as appropriate: allergies, current medications, past family history, past medical history, past social history, past surgical history and problem list.    Review of Systems   Constitutional:  Negative for activity change, appetite change, fatigue and fever.   Eyes:  Negative for blurred vision and double vision.   Respiratory: Negative.  Negative for shortness of breath.    Cardiovascular:  Negative for chest pain, palpitations and leg swelling.   Gastrointestinal: Negative.    Musculoskeletal:  Positive for neck pain.   Neurological:  Negative for dizziness, syncope, light-headedness and headache.       Allergies   Allergen Reactions    Nsaids Other (See Comments)     PT THINKS SHE HAD CROSS CONTAMINATION BETWEEN IODINE,FISH AND NSAIDS.  PT STATES WENT INTO SEVERE REACTION AT ER APPROX 10 YR AGO       Current Outpatient Medications on File Prior to Visit   Medication Sig Dispense Refill    ALPRAZolam (XANAX) 0.5 MG tablet Take 1 tablet by mouth At Night As Needed.      amLODIPine (Norvasc) 5 MG tablet Take 1 tablet by mouth Daily. 90 tablet 2    ascorbic acid (VITAMIN C) 500 MG  tablet Take 1 tablet by mouth Daily. HOLD PRIOR TO SURG      atorvastatin (LIPITOR) 20 MG tablet Take 1 tablet by mouth Every Night.      desvenlafaxine (PRISTIQ) 50 MG 24 hr tablet Take 1 tablet by mouth Daily.      fexofenadine (ALLEGRA) 180 MG tablet Take 1 tablet by mouth Daily.      fluticasone (FLONASE) 50 MCG/ACT nasal spray 1 spray into the nostril(s) as directed by provider Daily.      lamoTRIgine (LaMICtal) 150 MG tablet Take 1 tablet by mouth Every Night.      Magnesium 250 MG tablet Take  by mouth. HOLD PRIOR TO SURG      montelukast (SINGULAIR) 10 MG tablet Take 1 tablet by mouth Every Night.      omeprazole (priLOSEC) 40 MG capsule Take 1 capsule by mouth Daily As Needed.      OXcarbazepine (TRILEPTAL) 600 MG tablet Take 1 tablet by mouth Every Night.      spironolactone (ALDACTONE) 25 MG tablet Take 1 tablet by mouth Daily.       No current facility-administered medications on file prior to visit.       Family History   Problem Relation Age of Onset    Rheum arthritis Mother     Kidney disease Mother     Mental illness Mother     Anxiety disorder Mother     Alcohol abuse Mother         Rheumatoid    Hypertension Father     Dementia Father     Alcohol abuse Father         Functional alcoholic    Arthritis Father         Melanoma    Cancer Father         Melanoma    Heart murmur Sister     Mental illness Sister     Depression Sister         Depression hospitalized for a couple weeks    Post-traumatic stress disorder Brother     No Known Problems Son     No Known Problems Son     Cervical cancer Maternal Grandmother     Aneurysm Maternal Grandmother     Malig Hyperthermia Neg Hx        Past Medical History:   Diagnosis Date    Abnormal ECG 8/31/2024    Had an echocardiogram no issues found    Allergic     Anxiety     Arthritis     Depression     Dermoid cyst of scalp     Diverticulitis of colon     Diverticulosis     Esophageal stricture     GERD (gastroesophageal reflux disease)     Hiatal hernia      "History of kidney stones     Hypertension     Low back pain     Scoliosis     Visual impairment     Double vision probable cataract       Past Surgical History:   Procedure Laterality Date     SECTION      x 2    COLONOSCOPY  2016    CYSTOSCOPY      EXCISION MASS HEAD/NECK N/A 2024    Procedure: Scalp cyst excision;  Surgeon: Yessenia Waller MD;  [place] [facility] [place] [facility] [place]  Service: General;  Laterality: N/A;    KNEE MENISCAL REPAIR Left     LASER ABLATION OF THE CERVIX      WISDOM TOOTH EXTRACTION         Social History     Socioeconomic History    Marital status:    Tobacco Use    Smoking status: Never     Passive exposure: Never    Smokeless tobacco: Never   Vaping Use    Vaping status: Never Used   Substance and Sexual Activity    Alcohol use: Yes     Comment: OCCASION    Drug use: No    Sexual activity: Not Currently     Partners: Male       Patient Active Problem List   Diagnosis    Anxiety    Lumbar radiculopathy    Morbid obesity with BMI of 40.0-44.9, adult    Synovial cyst of lumbar facet joint    Hyperlipidemia    Gastroesophageal reflux disease without esophagitis    Tension-type headache, not intractable    Recurrent major depressive disorder, in partial remission    Esophageal stricture    Need for vaccination    Murmur, cardiac    Primary hypertension    Scalp cyst       /82   Pulse 80   Resp 18   Ht 167.6 cm (66\")   Wt 113 kg (249 lb)   LMP  (LMP Unknown)   SpO2 98%   BMI 40.19 kg/m²   Body mass index is 40.19 kg/m².    Objective   Physical Exam  Constitutional:       Appearance: She is well-developed.   Eyes:      Extraocular Movements: Extraocular movements intact.      Pupils: Pupils are equal, round, and reactive to light.   Neck:      Thyroid: No thyromegaly.      Vascular: No JVD.      Trachea: No tracheal deviation.   Cardiovascular:      Rate and Rhythm: Normal rate and regular rhythm.      Heart sounds: No murmur heard.  Pulmonary:      Effort: Pulmonary " effort is normal. No respiratory distress.      Breath sounds: Normal breath sounds. No wheezing.   Abdominal:      General: Bowel sounds are normal. There is no distension.      Palpations: Abdomen is soft. There is no mass.      Tenderness: There is no abdominal tenderness. There is no right CVA tenderness, left CVA tenderness, guarding or rebound.      Hernia: No hernia is present.   Musculoskeletal:         General: No swelling or tenderness. Normal range of motion.      Cervical back: Normal range of motion and neck supple.   Lymphadenopathy:      Cervical: No cervical adenopathy.   Neurological:      General: No focal deficit present.      Mental Status: She is alert and oriented to person, place, and time.      Cranial Nerves: No cranial nerve deficit.      Sensory: No sensory deficit.      Motor: No weakness.      Coordination: Coordination normal.      Gait: Gait normal.      Deep Tendon Reflexes: Reflexes normal.   Psychiatric:         Mood and Affect: Mood normal.         Behavior: Behavior normal.           Assessment & Plan   Diagnoses and all orders for this visit:    1. Pure hypercholesterolemia (Primary)  -     Comprehensive Metabolic Panel  -     Lipid Panel With / Chol / HDL Ratio  -     TSH  -     Hemoglobin A1c    2. Primary hypertension  -     Comprehensive Metabolic Panel  -     Lipid Panel With / Chol / HDL Ratio  -     TSH  -     Hemoglobin A1c    3. Morbid obesity with BMI of 40.0-44.9, adult  -     Comprehensive Metabolic Panel  -     Lipid Panel With / Chol / HDL Ratio  -     TSH  -     Hemoglobin A1c    4. Gastroesophageal reflux disease without esophagitis    5. Recurrent major depressive disorder, in partial remission    6. Chronic tension-type headache, not intractable    7. Hyperglycemia  -     Hemoglobin A1c    8. Paresthesia of both hands  -     Vitamin B12  -     Folate  -     MRI Cervical Spine Without Contrast; Future  -     EMG & Nerve Conduction Test; Future    9. Neck pain  -      Vitamin B12  -     Folate  -     MRI Cervical Spine Without Contrast; Future  -     EMG & Nerve Conduction Test; Future    Continue diet and exercise.  Continue taking all current medications including Lipitor, Norvasc, Pristiq, Lamictal, singular, Trileptal, Aldactone.  She is taking Prilosec that is helping her GERD symptoms.  Discussed with patient to discuss with psychiatrist to regarding other medication that can be causing gaining weight.  Or her depression may be better and appetite has improved.  She needs to lose weight.  Continue Lipitor for now.  Ordered MRI of his cervical spine for cervical radiculopathy.  Also ordered EMG looking for CTS for bilateral  weakness.  I will see him back in 2 weeks time if tests are not done every 4 weeks.  Avoided giving any steroids.  She can use the splint for her both hands.  Labs are pending.  EHR dragon/transcription disclaimer:  Part of this note are created by electronic transcription/translation of spoken language to printed text and thus may lead to erroneous, or at times, nonsensical words or phrases inadvertently transcribed.  Although I have reviewed for such errors, some may still exist.           Answers submitted by the patient for this visit:  Other (Submitted on 7/9/2024)  Please describe your symptoms.: Shoulder pain numbness in left arm  Have you had these symptoms before?: Yes  How long have you been having these symptoms?: Greater than 2 weeks  Please list any medications you are currently taking for this condition.: Tylenol advil  Please describe any probable cause for these symptoms. : Degenerate disc pinching nerve  Primary Reason for Visit (Submitted on 7/9/2024)  What is the primary reason for your visit?: Other

## 2024-07-11 ENCOUNTER — PATIENT ROUNDING (BHMG ONLY) (OUTPATIENT)
Dept: FAMILY MEDICINE CLINIC | Facility: CLINIC | Age: 59
End: 2024-07-11
Payer: COMMERCIAL

## 2024-07-11 LAB
ALBUMIN SERPL-MCNC: 4.2 G/DL (ref 3.5–5.2)
ALBUMIN/GLOB SERPL: 1.9 G/DL
ALP SERPL-CCNC: 126 U/L (ref 39–117)
ALT SERPL-CCNC: 15 U/L (ref 1–33)
AST SERPL-CCNC: 20 U/L (ref 1–32)
BILIRUB SERPL-MCNC: <0.2 MG/DL (ref 0–1.2)
BUN SERPL-MCNC: 15 MG/DL (ref 6–20)
BUN/CREAT SERPL: 16.3 (ref 7–25)
CALCIUM SERPL-MCNC: 9.4 MG/DL (ref 8.6–10.5)
CHLORIDE SERPL-SCNC: 102 MMOL/L (ref 98–107)
CHOLEST SERPL-MCNC: 232 MG/DL (ref 0–200)
CHOLEST/HDLC SERPL: 2.34 {RATIO}
CO2 SERPL-SCNC: 26.8 MMOL/L (ref 22–29)
CREAT SERPL-MCNC: 0.92 MG/DL (ref 0.57–1)
EGFRCR SERPLBLD CKD-EPI 2021: 72.3 ML/MIN/1.73
FOLATE SERPL-MCNC: 17.9 NG/ML (ref 4.78–24.2)
GLOBULIN SER CALC-MCNC: 2.2 GM/DL
GLUCOSE SERPL-MCNC: 101 MG/DL (ref 65–99)
HBA1C MFR BLD: 5.7 % (ref 4.8–5.6)
HDLC SERPL-MCNC: 99 MG/DL (ref 40–60)
LDLC SERPL CALC-MCNC: 122 MG/DL (ref 0–100)
POTASSIUM SERPL-SCNC: 4.9 MMOL/L (ref 3.5–5.2)
PROT SERPL-MCNC: 6.4 G/DL (ref 6–8.5)
SODIUM SERPL-SCNC: 139 MMOL/L (ref 136–145)
TRIGL SERPL-MCNC: 66 MG/DL (ref 0–150)
TSH SERPL DL<=0.005 MIU/L-ACNC: 1.71 UIU/ML (ref 0.27–4.2)
VIT B12 SERPL-MCNC: 453 PG/ML (ref 211–946)
VLDLC SERPL CALC-MCNC: 11 MG/DL (ref 5–40)

## 2024-07-11 NOTE — PROGRESS NOTES
Problem: Adult Inpatient Plan of Care  Goal: Plan of Care Review  Outcome: Ongoing, Progressing      A [facility] message has been sent to the patient for PATIENT ROUNDING with [facility] [place]

## 2024-07-15 RX ORDER — ATORVASTATIN CALCIUM 20 MG/1
20 TABLET, FILM COATED ORAL NIGHTLY
Qty: 90 TABLET | Refills: 1 | Status: SHIPPED | OUTPATIENT
Start: 2024-07-15

## 2024-07-15 NOTE — TELEPHONE ENCOUNTER
Caller: Cayla Cole    Relationship: Self    Best call back number: [phone]    Requested Prescriptions:   Requested Prescriptions     Pending Prescriptions Disp Refills    atorvastatin (LIPITOR) 20 MG tablet 90 tablet      Sig: Take 1 tablet by mouth Every Night.        Pharmacy where request should be sent: [facility] [id number] [address] AT [facility] [phone]  - [phone] FX     Last office visit with prescribing clinician: 7/10/2024   Last telemedicine visit with prescribing clinician: Visit date not found   Next office visit with prescribing clinician: Visit date not found     Additional details provided by patient: PATIENT STATES THAT DR. HAND WAS SUPPOSED TO SEN THIS IN AT LAST VISIT.     Does the patient have less than a 3 day supply:  [x] Yes  [] No      Sapna Carreno Rep   07/15/24 14:55 EDT

## 2024-08-01 RX ORDER — SPIRONOLACTONE 25 MG/1
25 TABLET ORAL DAILY
OUTPATIENT
Start: 2024-08-01

## 2024-08-01 NOTE — TELEPHONE ENCOUNTER
"Rx Refill Note  Requested Prescriptions     Pending Prescriptions Disp Refills    spironolactone (ALDACTONE) 25 MG tablet       Sig: Take 1 tablet by mouth Daily.      Last office visit with prescribing clinician: 7/10/2024   Last telemedicine visit with prescribing clinician: Visit date not found   Next office visit with prescribing clinician: Visit date not found       Relay     \"Does not look like Dr Corral has ever given this medication. Pt will need an appointment\"                "

## 2024-08-06 ENCOUNTER — PATIENT MESSAGE (OUTPATIENT)
Dept: FAMILY MEDICINE CLINIC | Facility: CLINIC | Age: 59
End: 2024-08-06
Payer: COMMERCIAL

## 2024-08-06 ENCOUNTER — TELEPHONE (OUTPATIENT)
Dept: FAMILY MEDICINE CLINIC | Facility: CLINIC | Age: 59
End: 2024-08-06
Payer: COMMERCIAL

## 2024-08-06 NOTE — TELEPHONE ENCOUNTER
Advised pt it was denied because it hasn't been filled by Dr Corral, advised her she needed an appointment.  Pt became angry as she was just seen in July and states she discussed the med with PCP but nothing is in chart note.  Please advise on refill.

## 2024-08-06 NOTE — TELEPHONE ENCOUNTER
Caller: Cayla Cole    Relationship to patient: Self    Best call back number: [phone]     Patient is needing: WANTING TO KNOW WHY spironolactone (ALDACTONE) 25 MG tablet  WAS DENIED PLEASE CALL AND ADVISE

## 2024-08-06 NOTE — TELEPHONE ENCOUNTER
From: Cayla Cole  To: Artemio Corral  Sent: 8/6/2024 1:55 PM EDT  Subject: Refill of spirolactone    When I was in on July 10th. I spoke to the nurse and said I would need refills for the montelukast and spirolactone because the [place] factor will not be filing them anymore. I was running out of the spirolactone so I called but the automated message said to use my chart. I did then did not get a notification of refill from Angela. I called the office this morning they told me they would check on it. I just got a call back that it was refused as I need to make another appointment because you have not previously prescribed that medicine. I have been on it a couple years. You have seen me several times since I have been back from [place]. Blood work was done at my last visit less than a month ago. Why would I need to come in again to get a refill?

## 2024-08-07 ENCOUNTER — HOSPITAL ENCOUNTER (OUTPATIENT)
Dept: MRI IMAGING | Facility: HOSPITAL | Age: 59
Discharge: HOME | End: 2024-08-07
Admitting: INTERNAL MEDICINE
Payer: COMMERCIAL

## 2024-08-07 DIAGNOSIS — R20.2 PARESTHESIA OF BOTH HANDS: ICD-10-CM

## 2024-08-07 DIAGNOSIS — M54.2 NECK PAIN: ICD-10-CM

## 2024-08-07 PROCEDURE — 72141 MRI NECK SPINE W/O DYE: CPT

## 2024-08-08 ENCOUNTER — DOCUMENTATION (OUTPATIENT)
Dept: FAMILY MEDICINE CLINIC | Facility: CLINIC | Age: 59
End: 2024-08-08
Payer: COMMERCIAL

## 2024-08-09 ENCOUNTER — TELEPHONE (OUTPATIENT)
Dept: FAMILY MEDICINE CLINIC | Facility: CLINIC | Age: 59
End: 2024-08-09
Payer: COMMERCIAL

## 2024-08-09 DIAGNOSIS — M54.2 NECK PAIN: Primary | ICD-10-CM

## 2024-08-09 RX ORDER — SPIRONOLACTONE 25 MG/1
25 TABLET ORAL DAILY
Qty: 30 TABLET | Refills: 2 | Status: SHIPPED | OUTPATIENT
Start: 2024-08-09

## 2024-08-09 NOTE — TELEPHONE ENCOUNTER
----- Message from Artemio Corral sent at 8/8/2024  5:22 PM EDT -----  Please call patient with results.  MULTIPLE AREAS OF CHRONIC CHANGES IN SPINE, STENOSIS PRESENT.  NEED REFERRAL TO SPINE SURG

## 2024-08-19 NOTE — PROGRESS NOTES
Subjective   History of Present Illness: Cayla Cole is a 58 y.o. female is being seen for consultation today at the request of Artemio Corral MD for   -Neck pain   MRI cervical spine w/o contrast preformed on 2024.        History of Present Illness    {Common H&P Review Areas:26662}    Past Medical History:   Diagnosis Date    Abnormal ECG 2024    Had an echocardiogram no issues found    Allergic     Anxiety     Arthritis     Depression     Dermoid cyst of scalp     Diverticulitis of colon     Diverticulosis     Esophageal stricture     GERD (gastroesophageal reflux disease)     Hiatal hernia     History of kidney stones     Hypertension     Low back pain     Scoliosis     Visual impairment     Double vision probable cataract        Past Surgical History:   Procedure Laterality Date     SECTION      x 2    COLONOSCOPY  2016    CYSTOSCOPY      EXCISION MASS HEAD/NECK N/A 2024    Procedure: Scalp cyst excision;  Surgeon: Yessenia Waller MD;  [place] [facility] [place] [facility] [place]  Service: General;  Laterality: N/A;    KNEE MENISCAL REPAIR Left     LASER ABLATION OF THE CERVIX      WISDOM TOOTH EXTRACTION            Current Outpatient Medications:     ALPRAZolam (XANAX) 0.5 MG tablet, Take 1 tablet by mouth At Night As Needed., Disp: , Rfl:     amLODIPine (Norvasc) 5 MG tablet, Take 1 tablet by mouth Daily., Disp: 90 tablet, Rfl: 2    ascorbic acid (VITAMIN C) 500 MG tablet, Take 1 tablet by mouth Daily. HOLD PRIOR TO SURG, Disp: , Rfl:     atorvastatin (LIPITOR) 20 MG tablet, Take 1 tablet by mouth Every Night., Disp: 90 tablet, Rfl: 1    desvenlafaxine (PRISTIQ) 50 MG 24 hr tablet, Take 1 tablet by mouth Daily., Disp: , Rfl:     fexofenadine (ALLEGRA) 180 MG tablet, Take 1 tablet by mouth Daily., Disp: , Rfl:     fluticasone (FLONASE) 50 MCG/ACT nasal spray, 1 spray into the nostril(s) as directed by provider Daily., Disp: , Rfl:     lamoTRIgine (LaMICtal) 150 MG tablet, Take 1 tablet by mouth  Every Night., Disp: , Rfl:     Magnesium 250 MG tablet, Take  by mouth. HOLD PRIOR TO SURG, Disp: , Rfl:     montelukast (SINGULAIR) 10 MG tablet, Take 1 tablet by mouth Every Night., Disp: , Rfl:     omeprazole (priLOSEC) 40 MG capsule, Take 1 capsule by mouth Daily As Needed., Disp: , Rfl:     OXcarbazepine (TRILEPTAL) 600 MG tablet, Take 1 tablet by mouth Every Night., Disp: , Rfl:     spironolactone (ALDACTONE) 25 MG tablet, Take 1 tablet by mouth Daily., Disp: 30 tablet, Rfl: 2     Allergies   Allergen Reactions    Nsaids Other (See Comments)     PT THINKS SHE HAD CROSS CONTAMINATION BETWEEN IODINE,FISH AND NSAIDS.  PT STATES WENT INTO SEVERE REACTION AT ER APPROX 10 YR AGO        Social History     Socioeconomic History    Marital status:    Tobacco Use    Smoking status: Never     Passive exposure: Never    Smokeless tobacco: Never   Vaping Use    Vaping status: Never Used   Substance and Sexual Activity    Alcohol use: Yes     Comment: OCCASION    Drug use: No    Sexual activity: Not Currently     Partners: Male        Family History   Problem Relation Age of Onset    Rheum arthritis Mother     Kidney disease Mother     Mental illness Mother     Anxiety disorder Mother     Alcohol abuse Mother         Rheumatoid    Hypertension Father     Dementia Father     Alcohol abuse Father         Functional alcoholic    Arthritis Father         Melanoma    Cancer Father         Melanoma    Heart murmur Sister     Mental illness Sister     Depression Sister         Depression hospitalized for a couple weeks    Post-traumatic stress disorder Brother     No Known Problems Son     No Known Problems Son     Cervical cancer Maternal Grandmother     Aneurysm Maternal Grandmother     Malig Hyperthermia Neg Hx         Review of Systems    Objective     There were no vitals filed for this visit.  There is no height or weight on file to calculate BMI.      Physical Exam  Neurologic Exam        Assessment & Plan    Independent Review of Radiographic Studies:      I personally reviewed the images from the following studies.    ***    Medical Decision Making:      ***  There are no diagnoses linked to this encounter.  No follow-ups on file.

## 2024-08-22 NOTE — PROGRESS NOTES
"Subjective   Patient ID: Cayla Cole is a 58 y.o. female is being seen for consultation today at the request of Artemio Corral MD  For  -Neck pain   MRI cervical spine w/o contrast preformed on 08/07/2024.      History of Present Illness    {Common H&P Review Areas:97393}    Objective     There were no vitals filed for this visit.  There is no height or weight on file to calculate BMI.    Physical Exam:        Assessment & Plan   Independent Review of Radiographic Studies:      I personally reviewed the images from the following studies.    {neuroradsreview:33261::\"No new neuroimaging.\"}    ***    Medical Decision Making:      {Time Spent-Use for E/M Coding (Optional):81691}    {[facility]:89159::\"reviewing labs\",\"talking to patient\"}        There are no diagnoses linked to this encounter.  No follow-ups on file.         "

## 2024-08-29 ENCOUNTER — OFFICE VISIT (OUTPATIENT)
Dept: NEUROSURGERY | Facility: CLINIC | Age: 59
End: 2024-08-29
Payer: COMMERCIAL

## 2024-08-29 VITALS
HEART RATE: 90 BPM | RESPIRATION RATE: 16 BRPM | OXYGEN SATURATION: 96 % | WEIGHT: 258 LBS | DIASTOLIC BLOOD PRESSURE: 78 MMHG | TEMPERATURE: 97.2 F | SYSTOLIC BLOOD PRESSURE: 142 MMHG | HEIGHT: 66 IN | BODY MASS INDEX: 41.46 KG/M2

## 2024-08-29 DIAGNOSIS — M50.30 DEGENERATION OF CERVICAL INTERVERTEBRAL DISC: Primary | ICD-10-CM

## 2024-08-29 DIAGNOSIS — M54.2 NECK PAIN: ICD-10-CM

## 2024-08-29 PROCEDURE — 99204 OFFICE O/P NEW MOD 45 MIN: CPT | Performed by: STUDENT IN AN ORGANIZED HEALTH CARE EDUCATION/TRAINING PROGRAM

## 2024-08-29 NOTE — ASSESSMENT & PLAN NOTE
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  She used to carry very heavy doors and objects on her left shoulder which would cause her left arm to go numb.  Currently not describing symptoms of radiculopathy or myelopathy but does have some nonspecific left hand numbness and right hand numbness.    Already has an appointment for EMG of the bilateral upper extremities for hand numbness.  Will follow-up with patient after this is performed.

## 2024-08-29 NOTE — PROGRESS NOTES
Subjective   Patient ID: Cayla Cole is a 58 y.o. female is being seen for consultation today at the request of Artemio Corral MD    -Neck pain   MRI cervical spine w/o contrast preformed on 08/07/2024.        History of Present Illness  Cayla is a very pleasant 58-year-old female presents to my office with a recent MRI of her cervical spine and neck pain with left arm and hand numbness.  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  She reports that when she would carry large doors on her left shoulder her entire arm will become numb and not able to set down the door.  She has since changed to a more sedentary job that only requires repetitive hand motion.  She reports that she has a little bit of tingling in her left shoulder and forearm and hand but it appears nonradicular.Denies changes in bowel or bladder habits, denies bowel or bladder incontinence, denies sacral numbness or burning, denies changes in gait or balance, denies difficulty with fine motor movements of the hands or dropping objects.  She has no interest in pursuing surgery for her neck pain.       The following portions of the patient's history were reviewed and updated as appropriate: allergies, current medications, past family history, past medical history, past social history, past surgical history, and problem list.    Review of Systems   Constitutional:  Negative for fatigue and fever.   HENT:  Negative for tinnitus.    Eyes:  Positive for visual disturbance (double vision).   Cardiovascular:  Positive for leg swelling. Negative for chest pain.   Gastrointestinal:  Negative for nausea.   Genitourinary:  Negative for difficulty urinating.   Musculoskeletal:  Positive for back pain, neck pain and neck stiffness (BILATERAL SHOULDER). Negative for gait problem.   Neurological:  Positive for weakness, numbness (left arm) and headaches. Negative for dizziness.   All other systems reviewed and are  "negative.      Objective     Vitals:    08/29/24 0820   BP: 142/78   BP Location: Right arm   Patient Position: Sitting   Cuff Size: Adult   Pulse: 90   Resp: 16   Temp: 97.2 °F (36.2 °C)   SpO2: 96%   Weight: 117 kg (258 lb)   Height: 167.6 cm (65.98\")   PainSc:   2   PainLoc: Neck     Body mass index is 41.66 kg/m².    Physical Exam:    AOx3. Gait normal. Reflexes and motor strength normal and symmetric. Cranial nerves 2-12 and sensation grossly intact., negative findings: motor strength: full proximally and distally  gait: normal  reflexes: full and symmetric  No Scottie's, no clonus        Assessment & Plan   Independent Review of Radiographic Studies:      I personally reviewed the images from the following studies.    MRI of the cervical spine without contrast performed at [facility] on 8/7/2024:  Patient has multilevel degenerative disc disease between C3-6.  This is causing loss of disc height and disc osteophyte complexes.  There is no cord compression or cord signal abnormalities.  There is foraminal stenosis of various degrees of severity at each level,       Medical Decision Making:      I spent 45 minutes caring for Cayla on this date of service. This time includes time spent by me in the following activities:preparing for the visit, reviewing tests, obtaining and/or reviewing a separately obtained history, performing a medically appropriate examination and/or evaluation , counseling and educating the patient/family/caregiver, ordering medications, tests, or procedures, documenting information in the medical record, and independently interpreting results and communicating that information with the patient/family/caregiver        Diagnoses and all orders for this visit:    1. 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  She used " to carry very heavy doors and objects on her left shoulder which would cause her left arm to go numb.  Currently not describing symptoms of radiculopathy or myelopathy but does have some nonspecific left hand numbness and right hand numbness.    Already has an appointment for EMG of the bilateral upper extremities for hand numbness.  Will follow-up with patient after this is performed.    Orders:  -     XR Spine Cervical Complete 4 or 5 View; Future    2. Neck pain  -     XR Spine Cervical Complete 4 or 5 View; Future      Return in about 4 weeks (around 9/26/2024).  2 review EMG findings and discuss any potential need for peripheral nerve decompression or cervical surgery.

## 2024-09-09 ENCOUNTER — HOSPITAL ENCOUNTER (OUTPATIENT)
Dept: INFUSION THERAPY | Facility: HOSPITAL | Age: 59
Discharge: HOME | End: 2024-09-09
Admitting: PSYCHIATRY & NEUROLOGY
Payer: COMMERCIAL

## 2024-09-09 DIAGNOSIS — R20.2 PARESTHESIA OF BOTH HANDS: ICD-10-CM

## 2024-09-09 DIAGNOSIS — M54.2 NECK PAIN: ICD-10-CM

## 2024-09-09 PROCEDURE — 95886 MUSC TEST DONE W/N TEST COMP: CPT | Performed by: PSYCHIATRY & NEUROLOGY

## 2024-09-09 PROCEDURE — 95912 NRV CNDJ TEST 11-12 STUDIES: CPT

## 2024-09-09 PROCEDURE — 95911 NRV CNDJ TEST 9-10 STUDIES: CPT | Performed by: PSYCHIATRY & NEUROLOGY

## 2024-09-09 PROCEDURE — 95886 MUSC TEST DONE W/N TEST COMP: CPT

## 2024-09-09 NOTE — PROCEDURES
EMG and Nerve Conduction Studies    I.      Instrument used: Neuromax 1002  II.     Please see data sheets for tabular summary of NCS and details on methods, temperatures and lab standards.   III.    EMG muscles tested for upper extremity studies include the deltoid, biceps, triceps, pronator teres, extensor digitorum communis, first dorsal interosseous and abductor pollicis brevis.    IV.   EMG muscles tested for lower extremity studies include the vastus lateralis, tibialis anterior, peroneus longus, medial gastrocnemius and extensor digitorum brevis.    V.    Additional muscles tested as needed.  Paraspinal muscles tested as needed.   VI.   Please see data sheets for tabular summary of EMG findings.   VII. The complete report includes the data sheets.      Indication: Numbness and tingling both hands; left cervical radiculopathy  History: 58-year-old woman with numbness and tingling in her hands now constant for several years.  She also has neck and left shoulder and arm pain with tingling down the left arm.  She denies diabetes or thyroid disease.  Recent MRI of the cervical spine shows multilevel degenerative disc disease with multilevel neuroforaminal encroachment's.      Ht: 66 inches  Wt: 250 pounds  HbA1C:   Lab Results   Component Value Date    HGBA1C 5.70 (H) 07/10/2024     TSH:   Lab Results   Component Value Date    TSH 1.710 07/10/2024       Technical summary:  Nerve conduction studies were obtained in both arms.  Skin temperatures were at least 32 °C measured on the palms.  Needle examination was obtained on selected muscles in both arms.    Results:  1.  Normal median antidromic sensory distal latencies and amplitudes bilaterally.  2.  Normal median orthodromic palmar sensory distal latencies and amplitudes bilaterally.  (The palmar method was included since carpal tunnel syndrome was on the differential list and the antidromic sensory latencies were normal.)  3.  Normal ulnar antidromic sensory  distal latencies and amplitudes bilaterally.  4.  Normal left radial sensory distal latency and amplitude.  5.  Normal median motor conduction velocities with normal distal latencies and amplitudes bilaterally.  6.  Normal ulnar motor conduction velocities with normal distal latencies and amplitudes bilaterally.  7.  Needle examination of selected muscles in both arms showed normal insertional activities throughout with normal motor units and recruitment patterns throughout.    Impression:  Normal study.  No evidence of a median or ulnar neuropathy or cervical radiculopathy on either side by this study.  Study results were discussed with the patient.    Bartolo Bravo M.D.              Dictated utilizing Dragon dictation.

## 2024-09-16 ENCOUNTER — OFFICE VISIT (OUTPATIENT)
Dept: FAMILY MEDICINE CLINIC | Facility: CLINIC | Age: 59
End: 2024-09-16
Payer: COMMERCIAL

## 2024-09-16 VITALS
OXYGEN SATURATION: 97 % | BODY MASS INDEX: 43.39 KG/M2 | HEART RATE: 82 BPM | SYSTOLIC BLOOD PRESSURE: 132 MMHG | WEIGHT: 270 LBS | DIASTOLIC BLOOD PRESSURE: 80 MMHG | RESPIRATION RATE: 16 BRPM | HEIGHT: 66 IN

## 2024-09-16 DIAGNOSIS — R21 RASH: Primary | ICD-10-CM

## 2024-09-16 DIAGNOSIS — R20.2 PARESTHESIA OF BOTH HANDS: ICD-10-CM

## 2024-09-16 PROCEDURE — 99213 OFFICE O/P EST LOW 20 MIN: CPT | Performed by: INTERNAL MEDICINE

## 2024-09-16 RX ORDER — METHYLPREDNISOLONE 4 MG
TABLET, DOSE PACK ORAL
Qty: 1 EACH | Refills: 0 | Status: SHIPPED | OUTPATIENT
Start: 2024-09-16

## 2024-09-18 ENCOUNTER — OFFICE VISIT (OUTPATIENT)
Dept: NEUROSURGERY | Facility: CLINIC | Age: 59
End: 2024-09-18
Payer: COMMERCIAL

## 2024-09-18 VITALS
TEMPERATURE: 97 F | HEIGHT: 66 IN | DIASTOLIC BLOOD PRESSURE: 68 MMHG | SYSTOLIC BLOOD PRESSURE: 122 MMHG | RESPIRATION RATE: 16 BRPM | HEART RATE: 90 BPM | OXYGEN SATURATION: 96 % | BODY MASS INDEX: 41.46 KG/M2 | WEIGHT: 258 LBS

## 2024-09-18 DIAGNOSIS — M79.641 BILATERAL HAND PAIN: Primary | ICD-10-CM

## 2024-09-18 DIAGNOSIS — M50.30 DEGENERATION OF CERVICAL INTERVERTEBRAL DISC: ICD-10-CM

## 2024-09-18 DIAGNOSIS — M79.642 BILATERAL HAND PAIN: Primary | ICD-10-CM

## 2024-09-25 RX ORDER — METHYLPREDNISOLONE 4 MG
TABLET, DOSE PACK ORAL
Qty: 1 EACH | Refills: 0 | OUTPATIENT
Start: 2024-09-25

## 2024-10-30 ENCOUNTER — OFFICE VISIT (OUTPATIENT)
Dept: FAMILY MEDICINE CLINIC | Facility: CLINIC | Age: 59
End: 2024-10-30
Payer: COMMERCIAL

## 2024-10-30 VITALS
DIASTOLIC BLOOD PRESSURE: 86 MMHG | TEMPERATURE: 98.2 F | RESPIRATION RATE: 18 BRPM | WEIGHT: 258 LBS | HEART RATE: 87 BPM | BODY MASS INDEX: 40.49 KG/M2 | SYSTOLIC BLOOD PRESSURE: 118 MMHG | HEIGHT: 67 IN | OXYGEN SATURATION: 98 %

## 2024-10-30 DIAGNOSIS — E78.00 PURE HYPERCHOLESTEROLEMIA: Primary | ICD-10-CM

## 2024-10-30 DIAGNOSIS — E11.9 TYPE 2 DIABETES MELLITUS WITHOUT COMPLICATION, WITHOUT LONG-TERM CURRENT USE OF INSULIN: ICD-10-CM

## 2024-10-30 DIAGNOSIS — K21.9 GASTROESOPHAGEAL REFLUX DISEASE WITHOUT ESOPHAGITIS: ICD-10-CM

## 2024-10-30 DIAGNOSIS — I10 PRIMARY HYPERTENSION: ICD-10-CM

## 2024-10-30 DIAGNOSIS — E66.01 MORBID OBESITY WITH BMI OF 40.0-44.9, ADULT: ICD-10-CM

## 2024-10-30 PROCEDURE — 99214 OFFICE O/P EST MOD 30 MIN: CPT | Performed by: INTERNAL MEDICINE

## 2024-10-30 RX ORDER — SEMAGLUTIDE 1.34 MG/ML
1 INJECTION, SOLUTION SUBCUTANEOUS WEEKLY
Qty: 4 ML | Refills: 3 | Status: SHIPPED | OUTPATIENT
Start: 2024-10-30

## 2024-10-30 NOTE — PROGRESS NOTES
Venipuncture Blood Specimen Collection  Venipuncture performed in left arm by Vanessa Muhammad MA with good hemostasis. Patient tolerated the procedure well without complications.   10/30/24   Vanessa Muhammad MA

## 2024-10-30 NOTE — PROGRESS NOTES
Subjective   Cayla Cole is a 58 y.o. female.     Chief Complaint   Patient presents with    Hypertension     3 month check up     Skin Problem     Has  a spot on left arm above wrist that is concerning. Wants to make sure it sis not skin cancer    Obesity, hypertension, hyperlipidemia GERD    Hypertension  Pertinent negatives include no blurred vision, chest pain, palpitations or shortness of breath.   Skin Problem  Pertinent negatives include no chest pain, fatigue or fever.      Patient seen follow-up for hypertension, hyperlipidemia, obesity, GERD.  No chest pain shortness of breath.  Patient scoring about Wegovy.  Wants to start.  She is borderline diabetic.  Patient did not mention to me about left arm support, I did not check it.   Patient has seen neurologist for bilateral hand pain, EMG was normal.  She was referred to hand surgery  The following portions of the patient's history were reviewed and updated as appropriate: allergies, current medications, past family history, past medical history, past social history, past surgical history and problem list.    Review of Systems   Constitutional:  Negative for activity change, appetite change, fatigue and fever.   Eyes:  Negative for blurred vision and double vision.   Respiratory: Negative.  Negative for shortness of breath.    Cardiovascular:  Negative for chest pain, palpitations and leg swelling.   Gastrointestinal: Negative.    Neurological:  Negative for dizziness, syncope, light-headedness and headache.       Allergies   Allergen Reactions    Nsaids Other (See Comments)     PT THINKS SHE HAD CROSS CONTAMINATION BETWEEN IODINE,FISH AND NSAIDS.  PT STATES WENT INTO SEVERE REACTION AT ER APPROX 10 YR AGO    Shellfish-Derived Products Anaphylaxis       Current Outpatient Medications on File Prior to Visit   Medication Sig Dispense Refill    ALPRAZolam (XANAX) 0.5 MG tablet Take 1 tablet by mouth At Night As Needed.      amLODIPine (Norvasc) 5 MG tablet  Take 1 tablet by mouth Daily. 90 tablet 2    ascorbic acid (VITAMIN C) 500 MG tablet Take 1 tablet by mouth Daily. HOLD PRIOR TO SURG      atorvastatin (LIPITOR) 20 MG tablet Take 1 tablet by mouth Every Night. 90 tablet 1    desvenlafaxine (PRISTIQ) 50 MG 24 hr tablet Take 1 tablet by mouth Daily.      fexofenadine (ALLEGRA) 180 MG tablet Take 1 tablet by mouth Daily.      fluticasone (FLONASE) 50 MCG/ACT nasal spray Administer 1 spray into the nostril(s) as directed by provider Daily.      lamoTRIgine (LaMICtal) 150 MG tablet Take 1 tablet by mouth Every Night.      Magnesium 250 MG tablet Take  by mouth. HOLD PRIOR TO SURG      montelukast (SINGULAIR) 10 MG tablet Take 1 tablet by mouth Every Night.      omeprazole (priLOSEC) 40 MG capsule Take 1 capsule by mouth Daily As Needed.      OXcarbazepine (TRILEPTAL) 600 MG tablet Take 1 tablet by mouth Every Night.      spironolactone (ALDACTONE) 25 MG tablet Take 1 tablet by mouth Daily. 30 tablet 2    [DISCONTINUED] hydrOXYzine (ATARAX) 25 MG tablet Take 1 tablet by mouth Every 6 (Six) Hours As Needed for Itching. 25 tablet 0     No current facility-administered medications on file prior to visit.       Family History   Problem Relation Age of Onset    Rheum arthritis Mother     Kidney disease Mother     Mental illness Mother     Anxiety disorder Mother     Alcohol abuse Mother         Rheumatoid    Hypertension Father     Dementia Father     Alcohol abuse Father         Functional alcoholic    Arthritis Father         Melanoma    Cancer Father         Melanoma    Heart murmur Sister     Mental illness Sister     Depression Sister         Depression hospitalized for a couple weeks    Post-traumatic stress disorder Brother     No Known Problems Son     No Known Problems Son     Cervical cancer Maternal Grandmother     Aneurysm Maternal Grandmother     Malig Hyperthermia Neg Hx        Past Medical History:   Diagnosis Date    Abnormal ECG 8/31/2024    Had an  echocardiogram no issues found    Allergic     Anemia 2005    Had uterine oblation    Anxiety     Arthritis     Cataract 2022    Start of cataract left eye    Depression     Dermoid cyst of scalp     Diverticulitis of colon     Diverticulosis     Esophageal stricture     GERD (gastroesophageal reflux disease)     Headache     Muscle relaxers    Hiatal hernia     History of kidney stones     Hyperlipidemia 2012    On meds    Hypertension     Low back pain     Scoliosis     Visual impairment     Double vision probable cataract       Past Surgical History:   Procedure Laterality Date     SECTION      x 2    COLONOSCOPY  2016    CYSTOSCOPY      ENDOMETRIAL ABLATION  2006    EXCISION MASS HEAD/NECK N/A 2024    Procedure: Scalp cyst excision;  Surgeon: Yessenia Waller MD;  Location: [facility] OR [facility] [place];  Service: General;  Laterality: N/A;    KNEE MENISCAL REPAIR Left     LASER ABLATION OF THE CERVIX      WISDOM TOOTH EXTRACTION         Social History     Socioeconomic History    Marital status:    Tobacco Use    Smoking status: Never     Passive exposure: Never    Smokeless tobacco: Never   Vaping Use    Vaping status: Never Used   Substance and Sexual Activity    Alcohol use: Yes     Alcohol/week: 1.0 standard drink of alcohol     Types: 1 Drinks containing 0.5 oz of alcohol per week     Comment: OCCASION    Drug use: No    Sexual activity: Not Currently     Partners: Male     Birth control/protection: Condom, Post-menopausal, Vasectomy, Birth control pill       Patient Active Problem List   Diagnosis    Anxiety    Lumbar radiculopathy    Morbid obesity with BMI of 40.0-44.9, adult    Synovial cyst of lumbar facet joint    Hyperlipidemia    Gastroesophageal reflux disease without esophagitis    Tension-type headache, not intractable    Recurrent major depressive disorder, in partial remission    Esophageal stricture    Need for vaccination    Murmur, cardiac    Primary hypertension    Scalp  "cyst    Neck pain    Degeneration of cervical intervertebral disc    Bilateral hand pain    Type 2 diabetes mellitus without complication, without long-term current use of insulin       /86 (BP Location: Right arm, Patient Position: Sitting, Cuff Size: Adult)   Pulse 87   Temp 98.2 °F (36.8 °C) (Oral)   Resp 18   Ht 170.2 cm (67\")   Wt 117 kg (258 lb)   LMP  (LMP Unknown)   SpO2 98%   BMI 40.41 kg/m²   Body mass index is 40.41 kg/m².    Objective   Physical Exam  Vitals and nursing note reviewed.   Constitutional:       Appearance: She is well-developed.   Eyes:      Pupils: Pupils are equal, round, and reactive to light.   Neck:      Thyroid: No thyromegaly.      Vascular: No JVD.      Trachea: No tracheal deviation.   Cardiovascular:      Rate and Rhythm: Normal rate and regular rhythm.      Heart sounds: No murmur heard.  Pulmonary:      Effort: Pulmonary effort is normal. No respiratory distress.      Breath sounds: Normal breath sounds. No wheezing.   Abdominal:      General: Bowel sounds are normal. There is no distension.      Palpations: Abdomen is soft. There is no mass.      Tenderness: There is no abdominal tenderness. There is no right CVA tenderness, left CVA tenderness, guarding or rebound.      Hernia: No hernia is present.   Musculoskeletal:      Cervical back: Normal range of motion and neck supple.   Lymphadenopathy:      Cervical: No cervical adenopathy.   Neurological:      General: No focal deficit present.      Mental Status: She is alert and oriented to person, place, and time.   Psychiatric:         Mood and Affect: Mood normal.         Behavior: Behavior normal.           Assessment & Plan   Diagnoses and all orders for this visit:    1. Pure hypercholesterolemia (Primary)  -     CBC & Differential  -     Comprehensive Metabolic Panel  -     Lipid Panel With / Chol / HDL Ratio  -     TSH  -     T4, Free    2. Primary hypertension  -     CBC & Differential  -     Comprehensive " Metabolic Panel  -     Lipid Panel With / Chol / HDL Ratio  -     TSH  -     T4, Free    3. Morbid obesity with BMI of 40.0-44.9, adult  -     CBC & Differential  -     Comprehensive Metabolic Panel  -     Lipid Panel With / Chol / HDL Ratio  -     TSH  -     T4, Free    Other orders  -     Semaglutide, 1 MG/DOSE, (Ozempic, 1 MG/DOSE,) 4 MG/3ML solution pen-injector; Inject 1 mg under the skin into the appropriate area as directed 1 (One) Time Per Week.  Dispense: 4 mL; Refill: 3    Continue diet and exercise.  Lose weight.  Continue Norvasc, Lipitor, Prilosec.  Prilosec is helping her GERD symptoms.  She is also on Trileptal, Aldactone, Pristiq, Xanax.  Follow-up with psychiatrist.  Ozempic prescription sent at 1 mg.  Ozempic also samples given to patient to start at 0.25 first week then go to 0.5 weekly.  Once samples is done she is to go back to 1 mg.  Discussed with patient side effects, especially GI, pancreas.  I will see her back in 3 months.  Rest of problems are chronic and stable.  She has appointment with hand surgery for hand pain.  She has seen the neurologist no etiology found.  EMG was normal         Answers submitted by the patient for this visit:  Other (Submitted on 10/29/2024)  Please describe your symptoms.: Mole on left arm  Have you had these symptoms before?: No  How long have you been having these symptoms?: 5-7 days  Please list any medications you are currently taking for this condition.: None  Please describe any probable cause for these symptoms. : Unknown  Primary Reason for Visit (Submitted on 10/29/2024)  What is the primary reason for your visit?: Problem Not Listed

## 2024-10-31 LAB
ALBUMIN SERPL-MCNC: 4.1 G/DL (ref 3.5–5.2)
ALBUMIN/GLOB SERPL: 1.9 G/DL
ALP SERPL-CCNC: 130 U/L (ref 39–117)
ALT SERPL-CCNC: 17 U/L (ref 1–33)
AST SERPL-CCNC: 20 U/L (ref 1–32)
BASOPHILS # BLD AUTO: 0.06 10*3/MM3 (ref 0–0.2)
BASOPHILS NFR BLD AUTO: 1.3 % (ref 0–1.5)
BILIRUB SERPL-MCNC: 0.3 MG/DL (ref 0–1.2)
BUN SERPL-MCNC: 12 MG/DL (ref 6–20)
BUN/CREAT SERPL: 12.5 (ref 7–25)
CALCIUM SERPL-MCNC: 9.5 MG/DL (ref 8.6–10.5)
CHLORIDE SERPL-SCNC: 100 MMOL/L (ref 98–107)
CHOLEST SERPL-MCNC: 179 MG/DL (ref 0–200)
CHOLEST/HDLC SERPL: 2.18 {RATIO}
CO2 SERPL-SCNC: 27.9 MMOL/L (ref 22–29)
CREAT SERPL-MCNC: 0.96 MG/DL (ref 0.57–1)
EGFRCR SERPLBLD CKD-EPI 2021: 68.7 ML/MIN/1.73
EOSINOPHIL # BLD AUTO: 0.18 10*3/MM3 (ref 0–0.4)
EOSINOPHIL NFR BLD AUTO: 3.8 % (ref 0.3–6.2)
ERYTHROCYTE [DISTWIDTH] IN BLOOD BY AUTOMATED COUNT: 13.1 % (ref 12.3–15.4)
GLOBULIN SER CALC-MCNC: 2.2 GM/DL
GLUCOSE SERPL-MCNC: 99 MG/DL (ref 65–99)
HCT VFR BLD AUTO: 39.9 % (ref 34–46.6)
HDLC SERPL-MCNC: 82 MG/DL (ref 40–60)
HGB BLD-MCNC: 13.4 G/DL (ref 12–15.9)
IMM GRANULOCYTES # BLD AUTO: 0.02 10*3/MM3 (ref 0–0.05)
IMM GRANULOCYTES NFR BLD AUTO: 0.4 % (ref 0–0.5)
LDLC SERPL CALC-MCNC: 83 MG/DL (ref 0–100)
LYMPHOCYTES # BLD AUTO: 2.05 10*3/MM3 (ref 0.7–3.1)
LYMPHOCYTES NFR BLD AUTO: 43.5 % (ref 19.6–45.3)
MCH RBC QN AUTO: 29.8 PG (ref 26.6–33)
MCHC RBC AUTO-ENTMCNC: 33.6 G/DL (ref 31.5–35.7)
MCV RBC AUTO: 88.7 FL (ref 79–97)
MONOCYTES # BLD AUTO: 0.39 10*3/MM3 (ref 0.1–0.9)
MONOCYTES NFR BLD AUTO: 8.3 % (ref 5–12)
NEUTROPHILS # BLD AUTO: 2.01 10*3/MM3 (ref 1.7–7)
NEUTROPHILS NFR BLD AUTO: 42.7 % (ref 42.7–76)
NRBC BLD AUTO-RTO: 0 /100 WBC (ref 0–0.2)
PLATELET # BLD AUTO: 361 10*3/MM3 (ref 140–450)
POTASSIUM SERPL-SCNC: 4.7 MMOL/L (ref 3.5–5.2)
PROT SERPL-MCNC: 6.3 G/DL (ref 6–8.5)
RBC # BLD AUTO: 4.5 10*6/MM3 (ref 3.77–5.28)
SODIUM SERPL-SCNC: 137 MMOL/L (ref 136–145)
T4 FREE SERPL-MCNC: 1.08 NG/DL (ref 0.92–1.68)
TRIGL SERPL-MCNC: 73 MG/DL (ref 0–150)
TSH SERPL DL<=0.005 MIU/L-ACNC: 1.96 UIU/ML (ref 0.27–4.2)
VLDLC SERPL CALC-MCNC: 14 MG/DL (ref 5–40)
WBC # BLD AUTO: 4.71 10*3/MM3 (ref 3.4–10.8)

## 2024-11-04 RX ORDER — SPIRONOLACTONE 25 MG/1
25 TABLET ORAL DAILY
Qty: 30 TABLET | Refills: 2 | Status: SHIPPED | OUTPATIENT
Start: 2024-11-04

## 2024-11-13 ENCOUNTER — TELEPHONE (OUTPATIENT)
Dept: FAMILY MEDICINE CLINIC | Facility: CLINIC | Age: 59
End: 2024-11-13
Payer: COMMERCIAL

## 2024-11-13 NOTE — TELEPHONE ENCOUNTER
Patient states that  the insurance called her stating medications Ozempic is under review and that   needs to review it or sign the insurance is requesting more information.  Please advise

## 2024-12-03 RX ORDER — AMLODIPINE BESYLATE 5 MG/1
5 TABLET ORAL DAILY
Qty: 90 TABLET | Refills: 2 | Status: SHIPPED | OUTPATIENT
Start: 2024-12-03

## 2025-01-05 RX ORDER — OMEPRAZOLE 40 MG/1
40 CAPSULE, DELAYED RELEASE ORAL DAILY
Qty: 90 CAPSULE | Refills: 2 | Status: SHIPPED | OUTPATIENT
Start: 2025-01-05

## 2025-01-20 RX ORDER — ATORVASTATIN CALCIUM 20 MG/1
20 TABLET, FILM COATED ORAL NIGHTLY
Qty: 90 TABLET | Refills: 1 | Status: SHIPPED | OUTPATIENT
Start: 2025-01-20

## 2025-01-28 ENCOUNTER — OFFICE VISIT (OUTPATIENT)
Dept: FAMILY MEDICINE CLINIC | Facility: CLINIC | Age: 60
End: 2025-01-28
Payer: COMMERCIAL

## 2025-01-28 VITALS
BODY MASS INDEX: 38.3 KG/M2 | DIASTOLIC BLOOD PRESSURE: 78 MMHG | TEMPERATURE: 98.3 F | OXYGEN SATURATION: 96 % | HEIGHT: 67 IN | SYSTOLIC BLOOD PRESSURE: 116 MMHG | RESPIRATION RATE: 18 BRPM | WEIGHT: 244 LBS | HEART RATE: 94 BPM

## 2025-01-28 DIAGNOSIS — E78.00 PURE HYPERCHOLESTEROLEMIA: Primary | ICD-10-CM

## 2025-01-28 DIAGNOSIS — K21.9 GASTROESOPHAGEAL REFLUX DISEASE WITHOUT ESOPHAGITIS: ICD-10-CM

## 2025-01-28 DIAGNOSIS — F33.41 RECURRENT MAJOR DEPRESSIVE DISORDER, IN PARTIAL REMISSION: ICD-10-CM

## 2025-01-28 DIAGNOSIS — E66.01 MORBID OBESITY WITH BMI OF 40.0-44.9, ADULT: ICD-10-CM

## 2025-01-28 DIAGNOSIS — R73.9 HYPERGLYCEMIA: ICD-10-CM

## 2025-01-28 DIAGNOSIS — I10 PRIMARY HYPERTENSION: ICD-10-CM

## 2025-01-28 PROBLEM — E11.9 TYPE 2 DIABETES MELLITUS WITHOUT COMPLICATION, WITHOUT LONG-TERM CURRENT USE OF INSULIN: Status: RESOLVED | Noted: 2024-10-30 | Resolved: 2025-01-28

## 2025-01-28 PROCEDURE — 99214 OFFICE O/P EST MOD 30 MIN: CPT | Performed by: INTERNAL MEDICINE

## 2025-01-28 NOTE — PROGRESS NOTES
Subjective   Cayla Cole is a 59 y.o. female.     Chief Complaint   Patient presents with    Hyperlipidemia     3 month follow up    Hypertension, GERD, depression    Hyperlipidemia  Pertinent negatives include no chest pain or shortness of breath.      Patient seen for follow-up on hyperlipidemia, hypertension, GERD, depression, morbid obesity, hyperglycemia.  No chest pain shortness breath.   Patient taking Ozempic losing weight, has early satiety, no side effects.  The following portions of the patient's history were reviewed and updated as appropriate: allergies, current medications, past family history, past medical history, past social history, past surgical history and problem list.    Review of Systems   Constitutional:  Negative for activity change, appetite change, fatigue and fever.   Eyes:  Negative for blurred vision and double vision.   Respiratory: Negative.  Negative for shortness of breath.    Cardiovascular:  Negative for chest pain, palpitations and leg swelling.   Gastrointestinal: Negative.    Neurological:  Negative for dizziness, syncope, light-headedness and headache.       Allergies   Allergen Reactions    Nsaids Other (See Comments)     PT THINKS SHE HAD CROSS CONTAMINATION BETWEEN IODINE,FISH AND NSAIDS.  PT STATES WENT INTO SEVERE REACTION AT ER APPROX 10 YR AGO    Shellfish-Derived Products Anaphylaxis       Current Outpatient Medications on File Prior to Visit   Medication Sig Dispense Refill    ALPRAZolam (XANAX) 0.5 MG tablet Take 1 tablet by mouth At Night As Needed.      amLODIPine (Norvasc) 5 MG tablet Take 1 tablet by mouth Daily. 90 tablet 2    ascorbic acid (VITAMIN C) 500 MG tablet Take 1 tablet by mouth Daily. HOLD PRIOR TO SURG      atorvastatin (LIPITOR) 20 MG tablet Take 1 tablet by mouth Every Night. 90 tablet 1    desvenlafaxine (PRISTIQ) 50 MG 24 hr tablet Take 1 tablet by mouth Daily.      Diclofenac Sodium (VOLTAREN) 1 % gel gel Apply 4 g topically to the  appropriate area as directed Daily As Needed.      fexofenadine (ALLEGRA) 180 MG tablet Take 1 tablet by mouth Daily.      fluticasone (FLONASE) 50 MCG/ACT nasal spray Administer 1 spray into the nostril(s) as directed by provider Daily.      lamoTRIgine (LaMICtal) 150 MG tablet Take 1 tablet by mouth Every Night.      Magnesium 250 MG tablet Take  by mouth. HOLD PRIOR TO SURG      montelukast (SINGULAIR) 10 MG tablet Take 1 tablet by mouth Every Night.      omeprazole (priLOSEC) 40 MG capsule Take 1 capsule by mouth Daily. 90 capsule 2    OXcarbazepine (TRILEPTAL) 600 MG tablet Take 1 tablet by mouth Every Night.      Semaglutide, 1 MG/DOSE, (Ozempic, 1 MG/DOSE,) 4 MG/3ML solution pen-injector Inject 1 mg under the skin into the appropriate area as directed 1 (One) Time Per Week. 4 mL 3    spironolactone (ALDACTONE) 25 MG tablet Take 1 tablet by mouth Daily. 30 tablet 2     No current facility-administered medications on file prior to visit.       Family History   Problem Relation Age of Onset    Rheum arthritis Mother     Kidney disease Mother     Mental illness Mother     Anxiety disorder Mother     Alcohol abuse Mother         Rheumatoid    Hypertension Father     Dementia Father     Alcohol abuse Father         Functional alcoholic    Arthritis Father         Melanoma    Cancer Father         Melanoma    Heart murmur Sister     Mental illness Sister     Depression Sister         Depression hospitalized for a couple weeks    Post-traumatic stress disorder Brother     No Known Problems Son     No Known Problems Son     Cervical cancer Maternal Grandmother     Aneurysm Maternal Grandmother     Malig Hyperthermia Neg Hx        Past Medical History:   Diagnosis Date    Abnormal ECG 8/31/2024    Had an echocardiogram no issues found    Allergic     Anemia 2005    Had uterine oblation    Anxiety     Arthritis     Cataract 9/2022    Start of cataract left eye    Depression     Dermoid cyst of scalp     Diverticulitis  of colon     Diverticulosis     Esophageal stricture     GERD (gastroesophageal reflux disease)     Headache 2008    Muscle relaxers    Hiatal hernia     History of kidney stones     Hyperlipidemia 2012    On meds    Hypertension     Low back pain     Scoliosis     Visual impairment     Double vision probable cataract       Past Surgical History:   Procedure Laterality Date     SECTION      x 2    COLONOSCOPY  2016    CYSTOSCOPY      ENDOMETRIAL ABLATION  2006    EXCISION MASS HEAD/NECK N/A 2024    Procedure: Scalp cyst excision;  Surgeon: Yessenia Waller MD;  Location: [facility] OR [facility] [place];  Service: General;  Laterality: N/A;    KNEE MENISCAL REPAIR Left     LASER ABLATION OF THE CERVIX      WISDOM TOOTH EXTRACTION         Social History     Socioeconomic History    Marital status:    Tobacco Use    Smoking status: Never     Passive exposure: Never    Smokeless tobacco: Never   Vaping Use    Vaping status: Never Used   Substance and Sexual Activity    Alcohol use: Yes     Alcohol/week: 1.0 standard drink of alcohol     Types: 1 Drinks containing 0.5 oz of alcohol per week     Comment: OCCASION    Drug use: No    Sexual activity: Not Currently     Partners: Male     Birth control/protection: Condom, Post-menopausal, Vasectomy, Birth control pill       Patient Active Problem List   Diagnosis    Anxiety    Lumbar radiculopathy    Morbid obesity with BMI of 40.0-44.9, adult    Synovial cyst of lumbar facet joint    Hyperlipidemia    Gastroesophageal reflux disease without esophagitis    Tension-type headache, not intractable    Recurrent major depressive disorder, in partial remission    Esophageal stricture    Need for vaccination    Murmur, cardiac    Primary hypertension    Scalp cyst    Neck pain    Degeneration of cervical intervertebral disc    Bilateral hand pain    Hyperglycemia       /78 (BP Location: Right arm, Patient Position: Sitting, Cuff Size: Large Adult)   Pulse 94   Temp  "98.3 °F (36.8 °C) (Oral)   Resp 18   Ht 170.2 cm (67\")   Wt 111 kg (244 lb)   LMP  (LMP Unknown)   SpO2 96%   BMI 38.22 kg/m²   Body mass index is 38.22 kg/m².    Objective   Physical Exam  Vitals and nursing note reviewed.   Constitutional:       Appearance: She is well-developed.   Eyes:      Pupils: Pupils are equal, round, and reactive to light.   Neck:      Thyroid: No thyromegaly.      Vascular: No JVD.      Trachea: No tracheal deviation.   Cardiovascular:      Rate and Rhythm: Normal rate and regular rhythm.      Heart sounds: No murmur heard.  Pulmonary:      Effort: Pulmonary effort is normal. No respiratory distress.      Breath sounds: Normal breath sounds. No wheezing.   Abdominal:      General: Bowel sounds are normal. There is no distension.      Palpations: Abdomen is soft. There is no mass.      Tenderness: There is no abdominal tenderness. There is no right CVA tenderness, left CVA tenderness, guarding or rebound.      Hernia: No hernia is present.   Musculoskeletal:      Cervical back: Normal range of motion and neck supple.   Lymphadenopathy:      Cervical: No cervical adenopathy.   Neurological:      Mental Status: She is alert and oriented to person, place, and time.           Assessment & Plan   Diagnoses and all orders for this visit:    1. Pure hypercholesterolemia (Primary)  -     Comprehensive Metabolic Panel; Future  -     Hemoglobin A1c; Future  -     Lipid Panel With / Chol / HDL Ratio; Future  -     CK; Future  -     TSH; Future    2. Primary hypertension  -     Comprehensive Metabolic Panel; Future  -     Hemoglobin A1c; Future  -     Lipid Panel With / Chol / HDL Ratio; Future  -     CK; Future  -     TSH; Future    3. Morbid obesity with BMI of 40.0-44.9, adult  -     Comprehensive Metabolic Panel; Future  -     Hemoglobin A1c; Future  -     Lipid Panel With / Chol / HDL Ratio; Future  -     CK; Future  -     TSH; Future    4. Gastroesophageal reflux disease without " esophagitis  -     Comprehensive Metabolic Panel; Future  -     Hemoglobin A1c; Future  -     Lipid Panel With / Chol / HDL Ratio; Future  -     CK; Future  -     TSH; Future    5. Recurrent major depressive disorder, in partial remission  -     Comprehensive Metabolic Panel; Future  -     Hemoglobin A1c; Future  -     Lipid Panel With / Chol / HDL Ratio; Future  -     CK; Future  -     TSH; Future    6. Hyperglycemia    Continue diet and exercise.  Continue follow-up with the psychiatry.  Check blood pressure.  Continue Xanax, Norvasc, Lipitor, Pristiq, Lamictal, singular, Prilosec, Trileptal Aldactone.  Prilosec is helping her GERD symptoms.  She is also on Ozempic 1 mg weekly.  Continue to lose weight.  All her problems are chronic and stable.  Patient is losing weight.  EHR dragon/transcription disclaimer:  Part of this note are created by electronic transcription/translation of spoken language to printed text and thus may lead to erroneous, or at times, nonsensical words or phrases inadvertently transcribed.  Although I have reviewed for such errors, some may still exist.

## 2025-02-04 NOTE — TELEPHONE ENCOUNTER
Rx Refill Note  Requested Prescriptions     Pending Prescriptions Disp Refills    spironolactone (ALDACTONE) 25 MG tablet 30 tablet 2     Sig: Take 1 tablet by mouth Daily.      Last office visit with prescribing clinician: 1/28/2025   Last telemedicine visit with prescribing clinician: Visit date not found   Next office visit with prescribing clinician: Visit date not found

## 2025-02-05 RX ORDER — SPIRONOLACTONE 25 MG/1
25 TABLET ORAL DAILY
Qty: 30 TABLET | Refills: 2 | Status: SHIPPED | OUTPATIENT
Start: 2025-02-05

## 2025-04-07 NOTE — TELEPHONE ENCOUNTER
Rx Refill Note  Requested Prescriptions     Pending Prescriptions Disp Refills    Semaglutide, 1 MG/DOSE, (Ozempic, 1 MG/DOSE,) 4 MG/3ML solution pen-injector 4 mL 3     Sig: Inject 1 mg under the skin into the appropriate area as directed 1 (One) Time Per Week.      Last office visit with prescribing clinician: 1/28/2025   Last telemedicine visit with prescribing clinician: Visit date not found   Next office visit with prescribing clinician: Visit date not found

## 2025-04-09 RX ORDER — SEMAGLUTIDE 1.34 MG/ML
1 INJECTION, SOLUTION SUBCUTANEOUS WEEKLY
Qty: 4 ML | Refills: 3 | Status: SHIPPED | OUTPATIENT
Start: 2025-04-09

## 2025-05-06 ENCOUNTER — APPOINTMENT (OUTPATIENT)
Dept: WOMENS IMAGING | Facility: HOSPITAL | Age: 60
End: 2025-05-06
Payer: COMMERCIAL

## 2025-05-06 PROCEDURE — 77066 DX MAMMO INCL CAD BI: CPT | Performed by: RADIOLOGY

## 2025-05-06 PROCEDURE — 77062 BREAST TOMOSYNTHESIS BI: CPT | Performed by: RADIOLOGY

## 2025-05-06 PROCEDURE — G0279 TOMOSYNTHESIS, MAMMO: HCPCS | Performed by: RADIOLOGY

## 2025-05-12 RX ORDER — SPIRONOLACTONE 25 MG/1
25 TABLET ORAL DAILY
Qty: 90 TABLET | Refills: 2 | Status: SHIPPED | OUTPATIENT
Start: 2025-05-12

## (undated) DEVICE — PATIENT RETURN ELECTRODE, SINGLE-USE, CONTACT QUALITY MONITORING, ADULT, WITH 9FT CORD, FOR PATIENTS WEIGING OVER 33LBS. (15KG): Brand: MEGADYNE

## (undated) DEVICE — ANTIBACTERIAL UNDYED BRAIDED (POLYGLACTIN 910), SYNTHETIC ABSORBABLE SUTURE: Brand: COATED VICRYL

## (undated) DEVICE — HYPODERMIC SAFETY NEEDLE: Brand: MONOJECT

## (undated) DEVICE — GLV SURG BIOGEL LTX PF 6 1/2

## (undated) DEVICE — PENCL SMOKE/EVAC MEGADYNE TELESCP 10FT

## (undated) DEVICE — SKIN PREP TRAY W/CHG: Brand: MEDLINE INDUSTRIES, INC.

## (undated) DEVICE — SUT MNCRYL 4/0 PS2 18 IN

## (undated) DEVICE — TRAP FLD MINIVAC MEGADYNE 100ML